# Patient Record
Sex: MALE | Race: WHITE | NOT HISPANIC OR LATINO | Employment: OTHER | URBAN - METROPOLITAN AREA
[De-identification: names, ages, dates, MRNs, and addresses within clinical notes are randomized per-mention and may not be internally consistent; named-entity substitution may affect disease eponyms.]

---

## 2023-04-28 LAB — CARCINOEMBRYONIC AG (NG/ML) IN SER/PLAS: 2 UG/L

## 2023-06-05 LAB — PROSTATE SPECIFIC AG (NG/ML) IN SER/PLAS: 7.05 NG/ML (ref 0–4)

## 2023-07-28 LAB
ALBUMIN (G/DL) IN SER/PLAS: 4.4 G/DL (ref 3.4–5)
ANION GAP IN SER/PLAS: 14 MMOL/L (ref 10–20)
CALCIUM (MG/DL) IN SER/PLAS: 10 MG/DL (ref 8.6–10.6)
CARBON DIOXIDE, TOTAL (MMOL/L) IN SER/PLAS: 27 MMOL/L (ref 21–32)
CARCINOEMBRYONIC AG (NG/ML) IN SER/PLAS: 1.8 UG/L
CHLORIDE (MMOL/L) IN SER/PLAS: 101 MMOL/L (ref 98–107)
CREATININE (MG/DL) IN SER/PLAS: 0.88 MG/DL (ref 0.5–1.3)
GFR MALE: 90 ML/MIN/1.73M2
GLUCOSE (MG/DL) IN SER/PLAS: 83 MG/DL (ref 74–99)
PHOSPHATE (MG/DL) IN SER/PLAS: 3.2 MG/DL (ref 2.5–4.9)
POTASSIUM (MMOL/L) IN SER/PLAS: 4.7 MMOL/L (ref 3.5–5.3)
SODIUM (MMOL/L) IN SER/PLAS: 137 MMOL/L (ref 136–145)
UREA NITROGEN (MG/DL) IN SER/PLAS: 17 MG/DL (ref 6–23)

## 2023-11-13 NOTE — PROGRESS NOTES
DANY Naranjo is a 73 y.o. male who was diagnosed with T3N0M0 laryngeal scc after he presented to ENT 12/2021 with hoarseness and neck pain x 6 months. He underwent direct laryngoscopy with biopsy, bronchoscopy, esophagoscopy, tracheostomy with Dr. Guerin and PEG placed by Dr. Walters on 1/10/2022. He completed chemoradiation 4/8/22. He was decannulated 5/5/22 by Dr. Guerin.      He was admitted 7/28-8/5 for covid pneumonia, dysphagia, and nausea. GI was consulted and performed EGD on 8/2; however, procedure was aborted due to inability to traverse the upper esophageal sphincter due to complete stenosis/possible radiation-induced stricture. Repeat EGD with fluoroscopy was performed 8/3 with esophageal dilation and stent placed. PET CT on 7/29 showed hypermetabolic masses within the sigmoid colon and hepatic flexure concerning for multifocal colonic malignancy. Colonoscopy performed 8/2 demonstrating a polypoid non-obstructing large mass was in the rectosigmoid at 15cm. Biopsies taken and path consistent with moderately differentiated invasive adenocarcinoma.     MRI prostate demonstrated a PIRAD 5 lesion. He underwent biopsy of the lesion which was negative. Plans to monitor with PSA levels. He is s/p open LAR on 1/4/23. Anastomosis about 8cm from anal verge. Path T2N1a. One tumor deposit in the perirectal soft tissue. He was started on CapeOx on 3/6 with plans for 4 cycles. He was admitted in March with dehydration after diarrhea, nausea and vomiting x3 days after his chemo and chemo was stopped.      Due for colonoscopy in Jan. Bowel function has improved. He is doing well overall.      CEA 8/30/22: 1.7; 2/2023 1.6; 4/2023 2.0; 7/2023 1.8     PET/CT Head & Neck staging 7/29/22: Hypermetabolic masses within the sigmoid colon and hepatic flexure. This is concerning for multifocal colonic malignancy, recommend correlation with colonoscopy and tissue sampling. Resolution of the patient's hypermetabolic  laryngeal mass and cervical lymphadenopathy, no residual hypermetabolic activity in this area to suggest locally recurrent/residual malignancy. Multifocal hypermetabolic consolidations of the bilateral lower lobes, in a pattern concerning for infectious inflammatory process such as aspiration pneumonia. Recommend correlation with clinical concern of infection and clinical findings of aspiration. Hypermetabolic focus within the left parotid gland, this is not completely characterized and differential possibilities include Warthin's tumor. Correlation with nonemergent parotid ultrasound may be of value.     CT angio chest 9/28/23: Since 3 days earlier, interval development of moderate bilateral lung infiltrates suggesting pneumonia, most pronounced in the right lower lobe. New small right pleural effusion. No detectable pulmonary emboli. Remainder as above.    CT a/p 9/25/2023: Bowel wall thickening in the visualized portions of the transverse and descending colon with adjacent pericolonic stranding suggestive of colitis. Other portions of the colon are not well seen due to lack of distention. Periportal edema. Prostatomegaly. Chronic pancreatitis. Mild centrilobular pulmonary emphysematous changes.     MRI Rectum 8/30/22: Polypoid intraluminal mass measuring 3.6 cm in maximal dimension, located 10.1 cm above the level of the anal verge, as discussed above, consistent with the known adenocarcinoma. No extraluminal tumor signal is demonstrated; most of the mass is located in an area above the level of the peritoneal reflection. No mesorectal or other pelvic lymphadenopathy is demonstrated. Nodular focus of diffusion restriction in the right aspect of the peripheral zone of the prostate, at the level of the mid gland (as discussed above). A prostate carcinoma is not excluded on the basis of this finding; dedicated contrast-enhanced MRI of the prostate is recommended for further evaluation. MRI based staging is: T1/2  N0.     Colonoscopy 8/2/22 ([facility]): Preparation of the colon was fair. One TA in the cecum, removed with a cold snare. Resected and retrieved. Injected. Three TA's in the ascending colon, removed with a cold snare. Resected and retrieved. One TA in the transverse colon, removed with a cold snare. Resected and retrieved. A polypoid non-obstructing large mass was found in the rectosigmoid -- on flexible exam this was at 15. The mass was non-circumferential. The mass measured four cm in length. It may have abroad stalk. No bleeding was present. This was biopsied with a cold forceps for histology. Path consistent with moderately differentiated invasive adenocarcinoma.     No past medical history on file.    Past Surgical History:   Procedure Laterality Date    OTHER SURGICAL HISTORY  08/18/2022    Shoulder surgery       Not on File    Review of Systems    Physical Exam  Constitutional:       Appearance: Normal appearance.   Abdominal:      General: Abdomen is flat.      Palpations: Abdomen is soft.      Comments: Midline scar well healed, no hernia   Neurological:      Mental Status: He is alert.         Assessment and Plan:   Doing well.  Colonoscopy in January - referred to Dr Bukc  Seeing his ENT next week to discuss swallowing difficulities. If he needs EGD, this can likely be coordinated.   CEA today  Follow up 4 months

## 2023-11-21 ENCOUNTER — LAB (OUTPATIENT)
Dept: LAB | Facility: LAB | Age: 73
End: 2023-11-21
Payer: MEDICARE

## 2023-11-21 ENCOUNTER — OFFICE VISIT (OUTPATIENT)
Dept: SURGERY | Facility: CLINIC | Age: 73
End: 2023-11-21
Payer: MEDICARE

## 2023-11-21 VITALS
BODY MASS INDEX: 18.84 KG/M2 | SYSTOLIC BLOOD PRESSURE: 116 MMHG | DIASTOLIC BLOOD PRESSURE: 72 MMHG | WEIGHT: 103 LBS | HEART RATE: 93 BPM

## 2023-11-21 DIAGNOSIS — C20 RECTAL CANCER (MULTI): ICD-10-CM

## 2023-11-21 LAB — CEA SERPL-MCNC: 2.1 UG/L

## 2023-11-21 PROCEDURE — 1126F AMNT PAIN NOTED NONE PRSNT: CPT | Performed by: SURGERY

## 2023-11-21 PROCEDURE — 99213 OFFICE O/P EST LOW 20 MIN: CPT | Performed by: SURGERY

## 2023-11-21 PROCEDURE — 3008F BODY MASS INDEX DOCD: CPT | Performed by: SURGERY

## 2023-11-21 PROCEDURE — 1159F MED LIST DOCD IN RCRD: CPT | Performed by: SURGERY

## 2023-11-21 PROCEDURE — 82378 CARCINOEMBRYONIC ANTIGEN: CPT

## 2023-11-21 PROCEDURE — 36415 COLL VENOUS BLD VENIPUNCTURE: CPT

## 2023-11-24 ENCOUNTER — APPOINTMENT (OUTPATIENT)
Dept: SURGERY | Facility: CLINIC | Age: 73
End: 2023-11-24

## 2023-11-30 ENCOUNTER — OFFICE VISIT (OUTPATIENT)
Dept: OTOLARYNGOLOGY | Facility: HOSPITAL | Age: 73
End: 2023-11-30
Payer: MEDICARE

## 2023-11-30 VITALS — HEIGHT: 62 IN | BODY MASS INDEX: 19.16 KG/M2 | WEIGHT: 104.1 LBS | TEMPERATURE: 97.2 F

## 2023-11-30 DIAGNOSIS — K22.2 ESOPHAGEAL STENOSIS: Primary | ICD-10-CM

## 2023-11-30 DIAGNOSIS — C76.0 MALIGNANT NEOPLASM OF HEAD, FACE AND NECK (MULTI): Primary | ICD-10-CM

## 2023-11-30 PROCEDURE — 99214 OFFICE O/P EST MOD 30 MIN: CPT | Performed by: STUDENT IN AN ORGANIZED HEALTH CARE EDUCATION/TRAINING PROGRAM

## 2023-11-30 PROCEDURE — 1126F AMNT PAIN NOTED NONE PRSNT: CPT | Performed by: STUDENT IN AN ORGANIZED HEALTH CARE EDUCATION/TRAINING PROGRAM

## 2023-11-30 PROCEDURE — 99214 OFFICE O/P EST MOD 30 MIN: CPT | Mod: 24 | Performed by: STUDENT IN AN ORGANIZED HEALTH CARE EDUCATION/TRAINING PROGRAM

## 2023-11-30 PROCEDURE — 1159F MED LIST DOCD IN RCRD: CPT | Performed by: STUDENT IN AN ORGANIZED HEALTH CARE EDUCATION/TRAINING PROGRAM

## 2023-11-30 PROCEDURE — 3008F BODY MASS INDEX DOCD: CPT | Performed by: STUDENT IN AN ORGANIZED HEALTH CARE EDUCATION/TRAINING PROGRAM

## 2023-11-30 PROCEDURE — 1160F RVW MEDS BY RX/DR IN RCRD: CPT | Performed by: STUDENT IN AN ORGANIZED HEALTH CARE EDUCATION/TRAINING PROGRAM

## 2023-11-30 ASSESSMENT — PATIENT HEALTH QUESTIONNAIRE - PHQ9
1. LITTLE INTEREST OR PLEASURE IN DOING THINGS: NOT AT ALL
SUM OF ALL RESPONSES TO PHQ9 QUESTIONS 1 & 2: 0
2. FEELING DOWN, DEPRESSED OR HOPELESS: NOT AT ALL

## 2024-01-04 ENCOUNTER — ANESTHESIA (OUTPATIENT)
Dept: GASTROENTEROLOGY | Facility: HOSPITAL | Age: 74
End: 2024-01-04
Payer: MEDICARE

## 2024-01-04 ENCOUNTER — ANESTHESIA EVENT (OUTPATIENT)
Dept: GASTROENTEROLOGY | Facility: HOSPITAL | Age: 74
End: 2024-01-04
Payer: MEDICARE

## 2024-01-04 ENCOUNTER — HOSPITAL ENCOUNTER (OUTPATIENT)
Dept: GASTROENTEROLOGY | Facility: HOSPITAL | Age: 74
Setting detail: OUTPATIENT SURGERY
Discharge: HOME | End: 2024-01-04
Payer: MEDICARE

## 2024-01-04 VITALS
OXYGEN SATURATION: 96 % | WEIGHT: 105 LBS | SYSTOLIC BLOOD PRESSURE: 121 MMHG | DIASTOLIC BLOOD PRESSURE: 76 MMHG | RESPIRATION RATE: 11 BRPM | HEIGHT: 62 IN | HEART RATE: 67 BPM | BODY MASS INDEX: 19.32 KG/M2 | TEMPERATURE: 97.7 F

## 2024-01-04 DIAGNOSIS — C20 RECTAL CANCER (MULTI): ICD-10-CM

## 2024-01-04 DIAGNOSIS — K22.2 ESOPHAGEAL STENOSIS: ICD-10-CM

## 2024-01-04 PROCEDURE — G0105 COLORECTAL SCRN; HI RISK IND: HCPCS | Performed by: STUDENT IN AN ORGANIZED HEALTH CARE EDUCATION/TRAINING PROGRAM

## 2024-01-04 PROCEDURE — 2500000005 HC RX 250 GENERAL PHARMACY W/O HCPCS

## 2024-01-04 PROCEDURE — 45378 DIAGNOSTIC COLONOSCOPY: CPT | Performed by: INTERNAL MEDICINE

## 2024-01-04 PROCEDURE — 99100 ANES PT EXTEME AGE<1 YR&>70: CPT | Performed by: ANESTHESIOLOGY

## 2024-01-04 PROCEDURE — 43235 EGD DIAGNOSTIC BRUSH WASH: CPT | Performed by: STUDENT IN AN ORGANIZED HEALTH CARE EDUCATION/TRAINING PROGRAM

## 2024-01-04 PROCEDURE — 2500000004 HC RX 250 GENERAL PHARMACY W/ HCPCS (ALT 636 FOR OP/ED)

## 2024-01-04 PROCEDURE — A45378 PR COLONOSCOPY,DIAGNOSTIC

## 2024-01-04 PROCEDURE — 7100000009 HC PHASE TWO TIME - INITIAL BASE CHARGE: Performed by: INTERNAL MEDICINE

## 2024-01-04 PROCEDURE — 3700000002 HC GENERAL ANESTHESIA TIME - EACH INCREMENTAL 1 MINUTE: Performed by: INTERNAL MEDICINE

## 2024-01-04 PROCEDURE — 7100000010 HC PHASE TWO TIME - EACH INCREMENTAL 1 MINUTE: Performed by: INTERNAL MEDICINE

## 2024-01-04 PROCEDURE — 3700000001 HC GENERAL ANESTHESIA TIME - INITIAL BASE CHARGE: Performed by: INTERNAL MEDICINE

## 2024-01-04 PROCEDURE — 43235 EGD DIAGNOSTIC BRUSH WASH: CPT | Performed by: INTERNAL MEDICINE

## 2024-01-04 PROCEDURE — A45378 PR COLONOSCOPY,DIAGNOSTIC: Performed by: ANESTHESIOLOGY

## 2024-01-04 RX ORDER — LIDOCAINE HCL/PF 100 MG/5ML
SYRINGE (ML) INTRAVENOUS AS NEEDED
Status: DISCONTINUED | OUTPATIENT
Start: 2024-01-04 | End: 2024-01-04

## 2024-01-04 RX ORDER — LIDOCAINE HYDROCHLORIDE 10 MG/ML
0.1 INJECTION INFILTRATION; PERINEURAL ONCE
OUTPATIENT
Start: 2024-01-04 | End: 2024-01-04

## 2024-01-04 RX ORDER — PROPOFOL 10 MG/ML
INJECTION, EMULSION INTRAVENOUS AS NEEDED
Status: DISCONTINUED | OUTPATIENT
Start: 2024-01-04 | End: 2024-01-04

## 2024-01-04 RX ORDER — FENTANYL CITRATE 50 UG/ML
INJECTION, SOLUTION INTRAMUSCULAR; INTRAVENOUS AS NEEDED
Status: DISCONTINUED | OUTPATIENT
Start: 2024-01-04 | End: 2024-01-04

## 2024-01-04 RX ORDER — SODIUM CHLORIDE, SODIUM LACTATE, POTASSIUM CHLORIDE, CALCIUM CHLORIDE 600; 310; 30; 20 MG/100ML; MG/100ML; MG/100ML; MG/100ML
50 INJECTION, SOLUTION INTRAVENOUS CONTINUOUS
OUTPATIENT
Start: 2024-01-04

## 2024-01-04 RX ORDER — PROPOFOL 10 MG/ML
INJECTION, EMULSION INTRAVENOUS CONTINUOUS PRN
Status: DISCONTINUED | OUTPATIENT
Start: 2024-01-04 | End: 2024-01-04

## 2024-01-04 RX ADMIN — PROPOFOL 200 MCG/KG/MIN: 10 INJECTION, EMULSION INTRAVENOUS at 12:28

## 2024-01-04 RX ADMIN — PROPOFOL 20 MG: 10 INJECTION, EMULSION INTRAVENOUS at 12:30

## 2024-01-04 RX ADMIN — SODIUM CHLORIDE, SODIUM LACTATE, POTASSIUM CHLORIDE, AND CALCIUM CHLORIDE: 600; 310; 30; 20 INJECTION, SOLUTION INTRAVENOUS at 12:24

## 2024-01-04 RX ADMIN — FENTANYL CITRATE 25 MCG: 50 INJECTION, SOLUTION INTRAMUSCULAR; INTRAVENOUS at 12:28

## 2024-01-04 RX ADMIN — LIDOCAINE HYDROCHLORIDE 50 MG: 20 INJECTION INTRAVENOUS at 12:28

## 2024-01-04 ASSESSMENT — PAIN SCALES - GENERAL
PAINLEVEL_OUTOF10: 0 - NO PAIN

## 2024-01-04 ASSESSMENT — PAIN - FUNCTIONAL ASSESSMENT
PAIN_FUNCTIONAL_ASSESSMENT: 0-10

## 2024-01-04 ASSESSMENT — COLUMBIA-SUICIDE SEVERITY RATING SCALE - C-SSRS
6. HAVE YOU EVER DONE ANYTHING, STARTED TO DO ANYTHING, OR PREPARED TO DO ANYTHING TO END YOUR LIFE?: NO
1. IN THE PAST MONTH, HAVE YOU WISHED YOU WERE DEAD OR WISHED YOU COULD GO TO SLEEP AND NOT WAKE UP?: NO
2. HAVE YOU ACTUALLY HAD ANY THOUGHTS OF KILLING YOURSELF?: NO

## 2024-01-04 NOTE — ANESTHESIA PREPROCEDURE EVALUATION
Patient: Jeff Naranjo    Procedure Information       Date/Time: 01/04/24 1200    Scheduled providers: Rashi Buck MD; Gemini Marcos MD; Kylah Gil RN    Procedures:       COLONOSCOPY      EGD    Location: [facility]            Relevant Problems   Anesthesia (within normal limits)      Cardiovascular (within normal limits)      Endocrine (within normal limits)      GI   (+) Esophageal stricture      /Renal (within normal limits)      Neuro/Psych (within normal limits)      Pulmonary (within normal limits)      GI/Hepatic (within normal limits)      Hematology (within normal limits)      Musculoskeletal (within normal limits)      Eyes, Ears, Nose, and Throat  History of laryngeal cancer s/p chemo radiation      Infectious Disease (within normal limits)       Clinical information reviewed:   Tobacco  Allergies  Meds   Med Hx  Surg Hx   Fam Hx  Soc Hx        NPO Detail:  NPO/Void Status  Carbonhydrate Drink Given Prior to Surgery? : Y  Date of Last Liquid: 01/04/24  Time of Last Liquid: 0800  Date of Last Solid: 01/02/24  Time of Last Solid: 0700  Last Intake Type: GI prep  Time of Last Void: 1124         Physical Exam    Airway  Mallampati: II  TM distance: >3 FB  Neck ROM: full     Cardiovascular    Dental   (+) upper dentures, lower dentures     Pulmonary    Abdominal            Anesthesia Plan    ASA 3     MAC     intravenous induction   Anesthetic plan and risks discussed with patient.

## 2024-01-04 NOTE — H&P
"History Of Present Illness  Jeff Naranjo is a 73 y.o. male presenting with hx of esophageal stricture due to throat CA s\p dilations and rectal CA s\p chemorads.     Past Medical History  History reviewed. No pertinent past medical history.  Surgical History  Past Surgical History:   Procedure Laterality Date    OTHER SURGICAL HISTORY  08/18/2022    Shoulder surgery     Social History  He reports that he has been smoking cigarettes. He does not have any smokeless tobacco history on file. He reports that he does not drink alcohol and does not use drugs.    Family History  No family history on file.     Allergies  No Known Allergies  Review of Systems   All other systems reviewed and are negative.       Physical Exam  Vitals reviewed.   Constitutional:       General: He is awake.   Cardiovascular:      Rate and Rhythm: Normal rate and regular rhythm.   Pulmonary:      Effort: Pulmonary effort is normal.      Breath sounds: Normal breath sounds.   Neurological:      Mental Status: He is alert and oriented to person, place, and time.   Psychiatric:         Attention and Perception: Attention and perception normal.         Behavior: Behavior normal.          Last Recorded Vitals  Blood pressure 130/78, pulse 78, temperature 36.4 °C (97.5 °F), temperature source Temporal, resp. rate 18, height 1.577 m (5' 2.09\"), weight 47.6 kg (105 lb), SpO2 100 %.    Assessment/Plan   Plan for EGD and colonoscopy     Binh Espinoza MD  "

## 2024-01-04 NOTE — ANESTHESIA POSTPROCEDURE EVALUATION
Patient: Jeff Naranjo    Procedure Summary       Date: 01/04/24 Room / Location: [facility]    Anesthesia Start: 1225 Anesthesia Stop: 1317    Procedures:       COLONOSCOPY      EGD Diagnosis:       Rectal cancer (CMS/HCC)      Esophageal stenosis    Scheduled Providers: Rashi Buck MD; Gemini Marcos MD; Kylah Gil RN Responsible Provider: Jb Durant MD    Anesthesia Type: MAC ASA Status: 3            Anesthesia Type: MAC    Vitals Value Taken Time   /76 01/04/24 1330   Temp 36.5 °C (97.7 °F) 01/04/24 1315   Pulse 64 01/04/24 1330   Resp 15 01/04/24 1330   SpO2 95 % 01/04/24 1330       Anesthesia Post Evaluation    Patient location during evaluation: PACU  Patient participation: complete - patient participated  Level of consciousness: awake and alert  Pain management: adequate  Airway patency: patent  Cardiovascular status: acceptable  Respiratory status: acceptable  Hydration status: acceptable  Postoperative Nausea and Vomiting: none        There were no known notable events for this encounter.

## 2024-03-20 NOTE — PROGRESS NOTES
HPI    Jeff Naranjo is a 73 y.o. male who was diagnosed with T3N0M0 laryngeal scc after he presented to ENT 12/2021 with hoarseness and neck pain x 6 months. He underwent direct laryngoscopy with biopsy, bronchoscopy, esophagoscopy, tracheostomy with Dr. Guerin and PEG placed by Dr. Walters on 1/10/2022. He completed chemoradiation 4/8/22. He was decannulated 5/5/22 by Dr. Guerin.      He was admitted 7/28-8/5 for covid pneumonia, dysphagia, and nausea. GI was consulted and performed EGD on 8/2; however, procedure was aborted due to inability to traverse the upper esophageal sphincter due to complete stenosis/possible radiation-induced stricture. Repeat EGD with fluoroscopy was performed 8/3 with esophageal dilation and stent placed. PET CT on 7/29 showed hypermetabolic masses within the sigmoid colon and hepatic flexure concerning for multifocal colonic malignancy. Colonoscopy performed 8/2 demonstrating a polypoid non-obstructing large mass was in the rectosigmoid at 15cm. Biopsies taken and path consistent with moderately differentiated invasive adenocarcinoma.     MRI prostate demonstrated a PIRAD 5 lesion. He underwent biopsy of the lesion which was negative. Plans to monitor with PSA levels. He is s/p open LAR on 1/4/23. Anastomosis about 8cm from anal verge. Path T2N1a. One tumor deposit in the perirectal soft tissue. He was started on CapeOx on 3/6 with plans for 4 cycles. He was admitted in March with dehydration after diarrhea, nausea and vomiting x3 days after his chemo and chemo was stopped.      He is moving his bowels 2 times per day. Some straining.      CEA 8/30/22: 1.7; 2/2023 1.6; 4/2023 2.0; 7/2023 1.8; 11/2023: 2.1     PET/CT Head & Neck staging 7/29/22: Hypermetabolic masses within the sigmoid colon and hepatic flexure. This is concerning for multifocal colonic malignancy, recommend correlation with colonoscopy and tissue sampling. Resolution of the patient's hypermetabolic laryngeal mass and  cervical lymphadenopathy, no residual hypermetabolic activity in this area to suggest locally recurrent/residual malignancy. Multifocal hypermetabolic consolidations of the bilateral lower lobes, in a pattern concerning for infectious inflammatory process such as aspiration pneumonia. Recommend correlation with clinical concern of infection and clinical findings of aspiration. Hypermetabolic focus within the left parotid gland, this is not completely characterized and differential possibilities include Warthin's tumor. Correlation with nonemergent parotid ultrasound may be of value.     CT angio chest 9/28/23: Since 3 days earlier, interval development of moderate bilateral lung infiltrates suggesting pneumonia, most pronounced in the right lower lobe. New small right pleural effusion. No detectable pulmonary emboli. Remainder as above.    CT a/p 9/25/2023: Bowel wall thickening in the visualized portions of the transverse and descending colon with adjacent pericolonic stranding suggestive of colitis. Other portions of the colon are not well seen due to lack of distention. Periportal edema. Prostatomegaly. Chronic pancreatitis. Mild centrilobular pulmonary emphysematous changes.     MRI Rectum 8/30/22: Polypoid intraluminal mass measuring 3.6 cm in maximal dimension, located 10.1 cm above the level of the anal verge, as discussed above, consistent with the known adenocarcinoma. No extraluminal tumor signal is demonstrated; most of the mass is located in an area above the level of the peritoneal reflection. No mesorectal or other pelvic lymphadenopathy is demonstrated. Nodular focus of diffusion restriction in the right aspect of the peripheral zone of the prostate, at the level of the mid gland (as discussed above). A prostate carcinoma is not excluded on the basis of this finding; dedicated contrast-enhanced MRI of the prostate is recommended for further evaluation. MRI based staging is: T1/2 N0.     Colonoscopy  1/4/24 ([facility]): Healthy end-to-end colorectal anastomosis in the rectosigmoid 15 cm from the anal verge. Normal. Repeat colonoscopy in 3 years       No past medical history on file.    Past Surgical History:   Procedure Laterality Date    OTHER SURGICAL HISTORY  08/18/2022    Shoulder surgery       No Known Allergies    Review of Systems   Constitutional:  Negative for activity change, appetite change, chills, diaphoresis, fatigue, fever and unexpected weight change.   All other systems reviewed and are negative.      Physical Exam  Constitutional:       Appearance: Normal appearance.   Abdominal:      General: Abdomen is flat.      Palpations: Abdomen is soft.      Comments: Midline scar well healed, no hernia   Neurological:      Mental Status: He is alert.         Assessment and Plan:   Doing well. Bowel function is good. Will add more fiber to diet as he gets constipated sometimes.   Labs today  Follow up 4 months - flex sig in office at that time, will need CT scan in fall

## 2024-03-21 ENCOUNTER — APPOINTMENT (OUTPATIENT)
Dept: OTOLARYNGOLOGY | Facility: HOSPITAL | Age: 74
End: 2024-03-21
Payer: MEDICARE

## 2024-03-22 ENCOUNTER — LAB (OUTPATIENT)
Dept: LAB | Facility: LAB | Age: 74
End: 2024-03-22
Payer: MEDICARE

## 2024-03-22 ENCOUNTER — OFFICE VISIT (OUTPATIENT)
Dept: SURGERY | Facility: CLINIC | Age: 74
End: 2024-03-22
Payer: MEDICARE

## 2024-03-22 VITALS
HEART RATE: 74 BPM | BODY MASS INDEX: 20.06 KG/M2 | WEIGHT: 110 LBS | SYSTOLIC BLOOD PRESSURE: 111 MMHG | DIASTOLIC BLOOD PRESSURE: 75 MMHG

## 2024-03-22 DIAGNOSIS — C20 RECTAL CANCER (MULTI): ICD-10-CM

## 2024-03-22 LAB — CEA SERPL-MCNC: 2.2 UG/L

## 2024-03-22 PROCEDURE — 82378 CARCINOEMBRYONIC ANTIGEN: CPT

## 2024-03-22 PROCEDURE — 36415 COLL VENOUS BLD VENIPUNCTURE: CPT

## 2024-03-22 PROCEDURE — 99213 OFFICE O/P EST LOW 20 MIN: CPT | Performed by: SURGERY

## 2024-03-22 PROCEDURE — 1159F MED LIST DOCD IN RCRD: CPT | Performed by: SURGERY

## 2024-03-22 ASSESSMENT — ENCOUNTER SYMPTOMS
CHILLS: 0
FEVER: 0
UNEXPECTED WEIGHT CHANGE: 0
FATIGUE: 0
APPETITE CHANGE: 0
ACTIVITY CHANGE: 0
DIAPHORESIS: 0

## 2024-04-04 ENCOUNTER — OFFICE VISIT (OUTPATIENT)
Dept: OTOLARYNGOLOGY | Facility: HOSPITAL | Age: 74
End: 2024-04-04
Payer: MEDICARE

## 2024-04-04 ENCOUNTER — LAB (OUTPATIENT)
Dept: LAB | Facility: HOSPITAL | Age: 74
End: 2024-04-04
Payer: MEDICARE

## 2024-04-04 VITALS — WEIGHT: 113.9 LBS | BODY MASS INDEX: 20.77 KG/M2

## 2024-04-04 DIAGNOSIS — C76.0 MALIGNANT NEOPLASM OF HEAD, FACE AND NECK (MULTI): ICD-10-CM

## 2024-04-04 DIAGNOSIS — T66.XXXD ADVERSE EFFECT OF RADIATION THERAPY, SUBSEQUENT ENCOUNTER: ICD-10-CM

## 2024-04-04 DIAGNOSIS — Z03.89 OBSERVATION FOR SUSPECTED CANCER: ICD-10-CM

## 2024-04-04 DIAGNOSIS — E03.9 ACQUIRED HYPOTHYROIDISM: ICD-10-CM

## 2024-04-04 LAB — TSH SERPL-ACNC: 3.68 MIU/L (ref 0.44–3.98)

## 2024-04-04 PROCEDURE — 99214 OFFICE O/P EST MOD 30 MIN: CPT | Performed by: STUDENT IN AN ORGANIZED HEALTH CARE EDUCATION/TRAINING PROGRAM

## 2024-04-04 PROCEDURE — 1159F MED LIST DOCD IN RCRD: CPT | Performed by: STUDENT IN AN ORGANIZED HEALTH CARE EDUCATION/TRAINING PROGRAM

## 2024-04-04 PROCEDURE — 36415 COLL VENOUS BLD VENIPUNCTURE: CPT

## 2024-04-04 PROCEDURE — 1160F RVW MEDS BY RX/DR IN RCRD: CPT | Performed by: STUDENT IN AN ORGANIZED HEALTH CARE EDUCATION/TRAINING PROGRAM

## 2024-04-04 PROCEDURE — 84443 ASSAY THYROID STIM HORMONE: CPT

## 2024-04-04 NOTE — PROGRESS NOTES
Chief Complaint:    Chief Complaint   Patient presents with    Follow-up     History of Present Illness:  74 y.o. male presents to [facility] with a T3N0M0 laryngeal squamous cell carcinoma. He underwent triple endoscopy, tracheostomy, and PEG tube placement on 1/10/2022. The patient completed his chemoradiation on 4/20/2022.     The patients course has been complicated by complete stenosis of the upper esophagus s/p dilation and stent placement on 8/3/2022 as well as a new finding of a sigmoid adenocarcinoma. In addition, during work-up for her his colon issues he was also found to have a prostate mass for which he is seeing urology. Work-up of the prostate was negative for malignancy. He is now status post surgery (with Dr. Fallon) for his rectal cancer.     11/30/23  The patient presents today for follow-up.  He has been doing okay from a laryngeal cancer standpoint.  He denies any breathing difficulties but has found that his swallowing has become more difficult.  He is still elevated to tolerate a regular diet, but he feels a tightness in his neck.  He has worked with speech therapy in the past, but not recently.  He is doing well from a colorectal cancer standpoint.  He is actually due to undergo a colonoscopy in January and there were thoughts that he could undergo an EGD at that time.  This will be with Dr. Buck.      Past Surgical History  Past Surgical History:   Procedure Laterality Date    OTHER SURGICAL HISTORY  08/18/2022    Shoulder surgery       Social History  Social History     Socioeconomic History    Marital status:      Spouse name: Not on file    Number of children: Not on file    Years of education: Not on file    Highest education level: Not on file   Occupational History    Not on file   Tobacco Use    Smoking status: Some Days     Types: Cigarettes    Smokeless tobacco: Not on file   Substance and Sexual Activity    Alcohol use: Not on file     "Drug use: Not on file    Sexual activity: Not on file   Other Topics Concern    Not on file   Social History Narrative    Not on file     Social Determinants of Health     Financial Resource Strain: Not on file   Food Insecurity: Not on file   Transportation Needs: Not on file   Physical Activity: Not on file   Stress: Not on file   Social Connections: Not on file   Intimate Partner Violence: Not on file   Housing Stability: Not on file       Family History  No family history on file.    Medications:  No current outpatient medications on file.     No current facility-administered medications for this visit.       Allergies: Patient has no known allergies.    Immunizations:   There is no immunization history on file for this patient.     Physical Exam  Vital Signs:  Temp 36.2 °C (97.2 °F)   Ht 1.575 m (5' 2\")   Wt 47.2 kg (104 lb 1.6 oz)   BMI 19.04 kg/m²   Constitutional   General appearance: Healthy-appearing, well-nourished, well groomed, in no acute distress. Thin male-appears thinner than last visit. Long beard. Appears to be M[employer]onite. Wife in room.  Ability to communicate: Normal communication without aids, normal voice quality. Hoarseness is significantly improved since last visit. No stridor.  Head and face: Atraumatic with no masses, lesions, or scarring.   Facial strength: Normal strength and symmetry, no synkinesis or facial tic.   Eyes   Pupils and irises: EOM intact, PERRLA, conjunctiva non-injected.   Ears   External inspection of ears: Ears normally formed and free of lesions.   Nose: Dorsum symmetric with no visible or palpable deformities.   External inspection of nose: No nasal lesions, lacerations, or scars. Nasal tip symmetrical with normal nasal valves.   Oral Cavity/Mouth   Lips, teeth, and gums: Normal lips, gums, and edentulous. Upper and lower dentures.  Oropharynx: Mucosa moist, no lesions. Hard and soft palate normal. Tongue normal, no lesions or edema. Tonsils normal, no lesions. "   Neck: Symmetrical, trachea midline. No masses visible. Radiation related firmness of the neck.  Palpation of cervical lymph nodes: No palpable lymph node enlargement, no submandibular adenopathy, no anterior cervical adenopathy, no supraclavicular adenopathy.   Neurological/Psychiatric   Cranial Nerve Examination: II - XII grossly intact.   Orientation to person, place, and time: Normal.   Mood and affect: Normal.   Skin: Normal without rashes or lesions.   Pulmonary   Respiratory effort: Chest expands symmetrically.   Cardiovascular: Good peripheral pulses   Peripheral vascular system: No varicosities, carotid pulse normal, no edema. No jugular venous distension.   Extremities Appearance of extremities: Normal. Gait normal.      Procedure  Procedure note: Recommended flexible nasopharyngoscopy. Risks, benefits, and alternatives were explained.   Procedure: Flexible nasopharyngoscopy   Indications: Head and neck cancer history  Anesthesia: 4% lidocaine and 0.5% phenylephrine   After adequate Afrin and lidocaine spray advance the flexible endoscope. I was able to visualize the nasal cavity and nasopharynx. The findings were notable for the following:  No masses/lesions/ulcers. The right vocal cord is completely immobile- this is stable from prior to treatment. There is no evidence of mucosal lesion or mass. He does have evidence of some anterior subglottic stenosis but he has an adequately patent airway posteriorly. No concerning lesions or masses. No pooling of secretions.     Impression/Plan:  73 y.o. male presenting to [facility] with a T3N0M0 laryngeal squamous cell carcinoma. He underwent triple endoscopy, tracheostomy, and PEG tube placement on 1/10/2022. The patient completed his chemoradiation on 4/20/2022. No evidence of disease today. He seems to also be doing well from a colorectal cancer standpoint.     -The patient is scheduled to undergo a colonoscopy in January.  I  will reach out to Dr. Buck to see whether an EGD with possible dilation can also be done at that time  -Continue follow-up with his colorectal team  -The patient is up-to-date in terms of TSH and chest imaging  -We will plan for follow-up in 4 months.

## 2024-04-04 NOTE — PROGRESS NOTES
Chief Complaint:    No chief complaint on file.      History of Present Illness:  74 y.o. male presents to [facility] with a T3N0M0 laryngeal squamous cell carcinoma. He underwent triple endoscopy, tracheostomy, and PEG tube placement on 1/10/2022. The patient completed his chemoradiation on 4/20/2022.     The patients course has been complicated by complete stenosis of the upper esophagus s/p dilation and stent placement on 8/3/2022 as well as a new finding of a sigmoid adenocarcinoma. In addition, during work-up for her his colon issues he was also found to have a prostate mass for which he is seeing urology. Work-up of the prostate was negative for malignancy. He is now status post surgery (with Dr. Fallon) for his rectal cancer.     04/04/24  The patient presents today for follow-up.  He has been doing fine from a laryngeal cancer standpoint.  He still continues to have some dysphagia and some tightness around his neck, which I think is radiation related.  He is following up with his gastroenterologist and colorectal surgeon.  He seems to be doing well from a rectal cancer standpoint.  He continues to tolerate a diet and his weight is stable.  He denies any voice or breathing difficulties.    Past Medical History  Rectal cancer, laryngeal cancer    Past Surgical History  Past Surgical History:   Procedure Laterality Date    OTHER SURGICAL HISTORY  08/18/2022    Shoulder surgery       Social History  Social History     Socioeconomic History    Marital status:      Spouse name: Not on file    Number of children: Not on file    Years of education: Not on file    Highest education level: Not on file   Occupational History    Not on file   Tobacco Use    Smoking status: Some Days     Types: Cigarettes    Smokeless tobacco: Not on file   Substance and Sexual Activity    Alcohol use: Never    Drug use: Never    Sexual activity: Defer   Other Topics Concern    Not on file    Social History Narrative    Not on file     Social Determinants of Health     Financial Resource Strain: Not on file   Food Insecurity: Not on file   Transportation Needs: Not on file   Physical Activity: Not on file   Stress: Not on file   Social Connections: Not on file   Intimate Partner Violence: Not on file   Housing Stability: Not on file       Family History  No family history on file.    Medications:  No current outpatient medications on file.     No current facility-administered medications for this visit.       Allergies: Patient has no known allergies.    Immunizations:   There is no immunization history on file for this patient.     Physical Exam  Vital Signs:  Wt 51.7 kg (113 lb 14.4 oz)   BMI 20.77 kg/m²   Constitutional   General appearance: Healthy-appearing, well-nourished, well groomed, in no acute distress. Thin male. Long beard. Appears to be M[employer]onite. Wife in room.  Ability to communicate: Normal communication without aids, normal voice quality. Hoarseness is significantly improved since last visit. No stridor.  Head and face: Atraumatic with no masses, lesions, or scarring.   Facial strength: Normal strength and symmetry, no synkinesis or facial tic.   Eyes   Pupils and irises: EOM intact, PERRLA, conjunctiva non-injected.   Ears   External inspection of ears: Ears normally formed and free of lesions.   Nose: Dorsum symmetric with no visible or palpable deformities.   External inspection of nose: No nasal lesions, lacerations, or scars. Nasal tip symmetrical with normal nasal valves.   Oral Cavity/Mouth   Lips, teeth, and gums: Normal lips, gums, and edentulous. Upper and lower dentures.  Oropharynx: Mucosa moist, no lesions. Hard and soft palate normal. Tongue normal, no lesions or edema. Tonsils normal, no lesions.   Neck: Symmetrical, trachea midline. No masses visible. Radiation related firmness of the neck.  Palpation of cervical lymph nodes: No palpable lymph node enlargement, no  submandibular adenopathy, no anterior cervical adenopathy, no supraclavicular adenopathy.   Neurological/Psychiatric   Cranial Nerve Examination: II - XII grossly intact.   Orientation to person, place, and time: Normal.   Mood and affect: Normal.   Skin: Normal without rashes or lesions.   Pulmonary   Respiratory effort: Chest expands symmetrically.   Cardiovascular: Good peripheral pulses   Peripheral vascular system: No varicosities, carotid pulse normal, no edema. No jugular venous distension.   Extremities Appearance of extremities: Normal. Gait normal.      Procedure  Procedure note: Recommended flexible nasopharyngoscopy. Risks, benefits, and alternatives were explained.   Procedure: Flexible nasopharyngoscopy   Indications: Head and neck cancer history  Anesthesia: 4% lidocaine and 0.5% phenylephrine   After adequate Afrin and lidocaine spray advance the flexible endoscope. I was able to visualize the nasal cavity and nasopharynx. The findings were notable for the following:  No masses/lesions/ulcers. The right vocal cord is completely immobile- this is stable from prior to treatment. There is no evidence of mucosal lesion or mass. He does have evidence of some anterior subglottic stenosis but he has an adequately patent airway posteriorly. No concerning lesions or masses. No pooling of secretions.     Impression/Plan:  74 y.o. male presenting to [facility] with a T3N0M0 laryngeal squamous cell carcinoma. He underwent triple endoscopy, tracheostomy, and PEG tube placement on 1/10/2022. The patient completed his chemoradiation on 4/20/2022. No evidence of disease today. He seems to also be doing well from a colorectal cancer standpoint.     -The patient will continue to follow-up with gastroenterology as well as colorectal surgery  -The patient is due for a TSH and chest imaging in September  -We will plan for follow-up in 4 months

## 2024-06-11 ENCOUNTER — HOSPITAL ENCOUNTER (OUTPATIENT)
Dept: RADIOLOGY | Facility: HOSPITAL | Age: 74
Discharge: HOME | End: 2024-06-11
Payer: MEDICARE

## 2024-06-11 DIAGNOSIS — C76.0 MALIGNANT NEOPLASM OF HEAD, FACE AND NECK (MULTI): ICD-10-CM

## 2024-06-11 DIAGNOSIS — Z03.89 OBSERVATION FOR SUSPECTED CANCER: ICD-10-CM

## 2024-06-11 PROCEDURE — 71046 X-RAY EXAM CHEST 2 VIEWS: CPT | Performed by: RADIOLOGY

## 2024-06-11 PROCEDURE — 71046 X-RAY EXAM CHEST 2 VIEWS: CPT

## 2024-07-15 ASSESSMENT — ENCOUNTER SYMPTOMS
FATIGUE: 0
UNEXPECTED WEIGHT CHANGE: 0
DIAPHORESIS: 0
APPETITE CHANGE: 0
FEVER: 0
CHILLS: 0
ACTIVITY CHANGE: 0

## 2024-07-15 NOTE — PROGRESS NOTES
DANY Naranjo is a 74 y.o. male who was diagnosed with T3N0M0 laryngeal scc after he presented to ENT 12/2021 with hoarseness and neck pain x 6 months. He underwent direct laryngoscopy with biopsy, bronchoscopy, esophagoscopy, tracheostomy with Dr. Guerin and PEG placed by Dr. Walters on 1/10/2022. He completed chemoradiation 4/8/22. He was decannulated 5/5/22 by Dr. Guerin.      He was admitted 7/28-8/5 for covid pneumonia, dysphagia, and nausea. GI was consulted and performed EGD on 8/2; however, procedure was aborted due to inability to traverse the upper esophageal sphincter due to complete stenosis/possible radiation-induced stricture. Repeat EGD with fluoroscopy was performed 8/3 with esophageal dilation and stent placed. PET CT on 7/29 showed hypermetabolic masses within the sigmoid colon and hepatic flexure concerning for multifocal colonic malignancy. Colonoscopy performed 8/2 demonstrating a polypoid non-obstructing large mass was in the rectosigmoid at 15cm. Biopsies taken and path consistent with moderately differentiated invasive adenocarcinoma.     He reports he is overall doing ok. He is trying to increase energy daily. He has a bm daily. He denies ay issues with bowel habits, occasional urgency but not bothered by it. Working out in yard.     MRI prostate demonstrated a PIRAD 5 lesion. He underwent biopsy of the lesion which was negative. Plans to monitor with PSA levels. He is s/p open LAR on 1/4/23. Anastomosis about 8cm from anal verge. Path T2N1a. One tumor deposit in the perirectal soft tissue. He was started on CapeOx on 3/6 with plans for 4 cycles. He was admitted in March with dehydration after diarrhea, nausea and vomiting x3 days after his chemo and chemo was stopped.     Due for CT and CEA in September, needs a flex sig today. He is 1.5 years out from surgery.     CEA 8/30/22: 1.7; 2/2023 1.6; 4/2023 2.0; 7/2023 1.8; 11/2023: 2.1; 3/2024 2.2     PET/CT Head & Neck staging 7/29/22:  Hypermetabolic masses within the sigmoid colon and hepatic flexure. This is concerning for multifocal colonic malignancy, recommend correlation with colonoscopy and tissue sampling. Resolution of the patient's hypermetabolic laryngeal mass and cervical lymphadenopathy, no residual hypermetabolic activity in this area to suggest locally recurrent/residual malignancy. Multifocal hypermetabolic consolidations of the bilateral lower lobes, in a pattern concerning for infectious inflammatory process such as aspiration pneumonia. Recommend correlation with clinical concern of infection and clinical findings of aspiration. Hypermetabolic focus within the left parotid gland, this is not completely characterized and differential possibilities include Warthin's tumor. Correlation with nonemergent parotid ultrasound may be of value.     CT angio chest 9/28/23: Since 3 days earlier, interval development of moderate bilateral lung infiltrates suggesting pneumonia, most pronounced in the right lower lobe. New small right pleural effusion. No detectable pulmonary emboli. Remainder as above.    CT a/p 9/25/2023: Bowel wall thickening in the visualized portions of the transverse and descending colon with adjacent pericolonic stranding suggestive of colitis. Other portions of the colon are not well seen due to lack of distention. Periportal edema. Prostatomegaly. Chronic pancreatitis. Mild centrilobular pulmonary emphysematous changes.     MRI Rectum 8/30/22: Polypoid intraluminal mass measuring 3.6 cm in maximal dimension, located 10.1 cm above the level of the anal verge, as discussed above, consistent with the known adenocarcinoma. No extraluminal tumor signal is demonstrated; most of the mass is located in an area above the level of the peritoneal reflection. No mesorectal or other pelvic lymphadenopathy is demonstrated. Nodular focus of diffusion restriction in the right aspect of the peripheral zone of the prostate, at the  level of the mid gland (as discussed above). A prostate carcinoma is not excluded on the basis of this finding; dedicated contrast-enhanced MRI of the prostate is recommended for further evaluation. MRI based staging is: T1/2 N0.     Colonoscopy 1/4/24 ([facility]): Healthy end-to-end colorectal anastomosis in the rectosigmoid 15 cm from the anal verge. Normal. Repeat colonoscopy in 3 years       No past medical history on file.    Past Surgical History:   Procedure Laterality Date    OTHER SURGICAL HISTORY  08/18/2022    Shoulder surgery       No Known Allergies    Review of Systems   Constitutional:  Negative for activity change, appetite change, chills, diaphoresis, fatigue, fever and unexpected weight change.   All other systems reviewed and are negative.      Physical Exam  Constitutional:       Appearance: Normal appearance.   Abdominal:      General: Abdomen is flat.      Palpations: Abdomen is soft.      Comments: Midline scar well healed, no hernia   Neurological:      Mental Status: He is alert.     General    Date/Time: 7/26/2024 9:40 AM    Performed by: Zabrina Orozco MD  Authorized by: Zabrina Orozco MD    Consent:     Consent obtained:  Written    Consent given by:  Patient  Universal protocol:     Patient identity confirmed:  Verbally with patient  Indications:     Indications:  Rectal or sigmoid mass  Sedation:     Sedation type:  None  Procedure specific details:      Healthy anastomosis 8cm   Post-procedure details:     Procedure completion:  Tolerated well, no immediate complications        Assessment and Plan:   Doing well. CEA today, CT scan in September  Follow up with me 4 months.

## 2024-07-26 ENCOUNTER — APPOINTMENT (OUTPATIENT)
Dept: SURGERY | Facility: CLINIC | Age: 74
End: 2024-07-26
Payer: MEDICARE

## 2024-07-26 ENCOUNTER — LAB (OUTPATIENT)
Dept: LAB | Facility: LAB | Age: 74
End: 2024-07-26
Payer: MEDICARE

## 2024-07-26 VITALS
SYSTOLIC BLOOD PRESSURE: 144 MMHG | WEIGHT: 102 LBS | HEART RATE: 105 BPM | BODY MASS INDEX: 18.6 KG/M2 | DIASTOLIC BLOOD PRESSURE: 67 MMHG

## 2024-07-26 DIAGNOSIS — C19 RECTOSIGMOID CANCER (MULTI): ICD-10-CM

## 2024-07-26 LAB
CEA SERPL-MCNC: 1.7 UG/L
CREAT SERPL-MCNC: 0.87 MG/DL (ref 0.5–1.3)
EGFRCR SERPLBLD CKD-EPI 2021: 90 ML/MIN/1.73M*2

## 2024-07-26 PROCEDURE — 36415 COLL VENOUS BLD VENIPUNCTURE: CPT

## 2024-07-26 PROCEDURE — 45341 SIGMOIDOSCOPY W/ULTRASOUND: CPT | Performed by: SURGERY

## 2024-07-26 PROCEDURE — 82565 ASSAY OF CREATININE: CPT

## 2024-07-26 PROCEDURE — 82378 CARCINOEMBRYONIC ANTIGEN: CPT

## 2024-07-26 PROCEDURE — 99213 OFFICE O/P EST LOW 20 MIN: CPT | Mod: 24 | Performed by: SURGERY

## 2024-08-06 ENCOUNTER — APPOINTMENT (OUTPATIENT)
Dept: OTOLARYNGOLOGY | Facility: HOSPITAL | Age: 74
End: 2024-08-06
Payer: MEDICARE

## 2024-09-03 ENCOUNTER — HOSPITAL ENCOUNTER (OUTPATIENT)
Dept: RADIOLOGY | Facility: HOSPITAL | Age: 74
Discharge: HOME | End: 2024-09-03
Payer: MEDICARE

## 2024-09-03 DIAGNOSIS — C19 RECTOSIGMOID CANCER (MULTI): ICD-10-CM

## 2024-09-03 PROCEDURE — A9698 NON-RAD CONTRAST MATERIALNOC: HCPCS | Performed by: SURGERY

## 2024-09-03 PROCEDURE — 2550000001 HC RX 255 CONTRASTS: Performed by: SURGERY

## 2024-09-03 PROCEDURE — 74177 CT ABD & PELVIS W/CONTRAST: CPT | Performed by: STUDENT IN AN ORGANIZED HEALTH CARE EDUCATION/TRAINING PROGRAM

## 2024-09-03 PROCEDURE — 71260 CT THORAX DX C+: CPT | Performed by: STUDENT IN AN ORGANIZED HEALTH CARE EDUCATION/TRAINING PROGRAM

## 2024-09-03 PROCEDURE — 74177 CT ABD & PELVIS W/CONTRAST: CPT

## 2024-10-01 ENCOUNTER — APPOINTMENT (OUTPATIENT)
Dept: OTOLARYNGOLOGY | Facility: HOSPITAL | Age: 74
End: 2024-10-01
Payer: MEDICARE

## 2024-11-26 NOTE — PROGRESS NOTES
History Of Present Illness  Jeff Naranjo is a 74 y.o. male presents for follow up surveillance of laryngeal cancer.       74 y.o. male presents to [facility] with a T3N0M0 laryngeal squamous cell carcinoma. He underwent triple endoscopy, tracheostomy, and PEG tube placement on 1/10/2022. The patient completed his chemoradiation on 4/20/2022.      The patients course has been complicated by complete stenosis of the upper esophagus s/p dilation and stent placement on 8/3/2022 as well as a new finding of a sigmoid adenocarcinoma. In addition, during work-up for her his colon issues he was also found to have a prostate mass for which he is seeing urology. Work-up of the prostate was negative for malignancy. He is now status post surgery (with Dr. Fallon) for his rectal cancer.      04/04/24- Last seen by Dr Guerin   The patient presents today for follow-up.  He has been doing fine from a laryngeal cancer standpoint.  He still continues to have some dysphagia and some tightness around his neck, which I think is radiation related.  He is following up with his gastroenterologist and colorectal surgeon.  He seems to be doing well from a rectal cancer standpoint.  He continues to tolerate a diet and his weight is stable.  He denies any voice or breathing difficulties.    Today,   Denies acute changes. Endorses fluctuating dysphagia with certain foods but overall manageable. Discussed xerostomia- currntly drinks a lot of water hasn't tried any mouth rinse. No new pain, sores, neck mass, etc.     Lab Results   Component Value Date    TSH 3.68 04/04/2024       Past Medical History  No past medical history on file.      Surgical History  Past Surgical History:   Procedure Laterality Date    OTHER SURGICAL HISTORY  08/18/2022    Shoulder surgery        Social History  He reports that he has been smoking cigarettes. He does not have any smokeless tobacco history on file. He reports that he  does not drink alcohol and does not use drugs.    Family History  No family history on file.     Allergies  Patient has no known allergies.     Physical Exam:  CONSTITUTIONAL:  No acute distress  VOICE:  mild-moderate hoarseness   RESPIRATION:  Breathing comfortably, no stridor  CV:  No clubbing/cyanosis/edema in hands  EYES:  EOM intact, sclera normal  NEURO:  Alert and oriented times 3, Cranial nerves II-XII grossly intact and symmetric bilaterally  HEAD AND FACE:  Symmetric facial features, no masses or lesion  SALIVARY GLANDS:  Parotid and submandibular glands normal bilaterally  EARS:  Normal external ears, external auditory canals, and TMs to otoscopy, normal hearing to conversational voice.  NOSE:  External nose midline, anterior rhinoscopy is normal with limited visualization to the anterior aspect of the interior turbinates, no bleeding or drainage, no lesions  ORAL CAVITY/OROPHARYNX/LIPS:  Normal mucous membranes, normal floor of mouth/tongue/OP, no masses or lesions  PHARYNGEAL WALLS:  No masses or lesions  NECK/LYMPH:  Neck skin with postradiation changes, fibrosis, no LAD  SKIN:  Neck skin is without scar or injury  PSYCH:  Alert and oriented with appropriate mood and affect    Procedure Note: Flexible Nasolaryngoscopy  Verbal informed consent was obtained from the patient/patient's guardian. 4% lidocaine mixed with phenylephrine was prepared and dripped into the nose. It was placed in the right naris. Following an appropriate amount of time to allow for adequate anesthesia, a flexible fiberoptic nasolaryngoscope was placed into the patient's right naris. The nasal cavity, nasopharynx, oropharynx, hypopharynx, and all endolaryngeal structures were visualized and were normal except as listed below. Significant findings included:  -thick secretions  at larynx no masses or lesions   - VC mobile bilaterally     Assessment and Plan  74 y.o. male with T3N0M0 laryngeal squamous cell carcinoma s/p definitive  CXRT completed 4/2022.     Scope today SHELL   Thyroid testing  yearly- 04/24 TSH wnl   Dysphagia- managing current diet,   Xerostomia - Recommend adding biotene    Ashley Hoang MD

## 2024-11-27 ASSESSMENT — ENCOUNTER SYMPTOMS
FATIGUE: 0
FEVER: 0
ACTIVITY CHANGE: 0
UNEXPECTED WEIGHT CHANGE: 0
APPETITE CHANGE: 0
DIAPHORESIS: 0
CHILLS: 0

## 2024-11-27 NOTE — PROGRESS NOTES
HPI    Jeff Naranjo is a 74 y.o. male who was diagnosed with T3N0M0 laryngeal scc after he presented to ENT 12/2021 with hoarseness and neck pain x 6 months. He underwent direct laryngoscopy with biopsy, bronchoscopy, esophagoscopy, tracheostomy with Dr. Guerin and PEG placed by Dr. Walters on 1/10/2022. He completed chemoradiation 4/8/22. He was decannulated 5/5/22 by Dr. Guerin.      He was admitted 7/28-8/5 for covid pneumonia, dysphagia, and nausea. GI was consulted and performed EGD on 8/2; however, procedure was aborted due to inability to traverse the upper esophageal sphincter due to complete stenosis/possible radiation-induced stricture. Repeat EGD with fluoroscopy was performed 8/3 with esophageal dilation and stent placed. PET CT on 7/29 showed hypermetabolic masses within the sigmoid colon and hepatic flexure concerning for multifocal colonic malignancy. Colonoscopy performed 8/2 demonstrating a polypoid non-obstructing large mass was in the rectosigmoid at 15cm. Biopsies taken and path consistent with moderately differentiated invasive adenocarcinoma.    MRI prostate demonstrated a PIRAD 5 lesion. He underwent biopsy of the lesion which was negative. Plans to monitor with PSA levels. He is s/p open LAR on 1/4/23. Anastomosis about 8cm from anal verge. Path T2N1a. One tumor deposit in the perirectal soft tissue. He was started on CapeOx on 3/6 with plans for 4 cycles. He was admitted in March with dehydration after diarrhea, nausea and vomiting x3 days after his chemo and chemo was stopped.     Today reports constipation, hard stools, difficulty passing stools. Drinks very little water, low fiber diet. Otherwise doing well        CEA 8/30/22: 1.7; 2/2023 1.6; 4/2023 2.0; 7/2023 1.8; 11/2023: 2.1; 3/2024 2.2; 7/2024: 1.7     CT c/a/p 9/3/24:   CHEST:  1. New (from 2022) left apical pulmonary nodule measuring 0.4 cm. Finding is indeterminate and could be infectious, inflammatory, however metastasis could  not be entirely excluded. For clinical correlation and attention on short-term follow-up.  2. No enlarged intrathoracic lymphadenopathy  ABDOMEN-PELVIS:  1. Status post sigmoidectomy with no gross soft tissue mass at the surgical bed or metastatic disease in the abdomen or pelvis.  2. Stable changes of chronic pancreatitis.     MRI Rectum 8/30/22: Polypoid intraluminal mass measuring 3.6 cm in maximal dimension, located 10.1 cm above the level of the anal verge, as discussed above, consistent with the known adenocarcinoma. No extraluminal tumor signal is demonstrated; most of the mass is located in an area above the level of the peritoneal reflection. No mesorectal or other pelvic lymphadenopathy is demonstrated. Nodular focus of diffusion restriction in the right aspect of the peripheral zone of the prostate, at the level of the mid gland (as discussed above). A prostate carcinoma is not excluded on the basis of this finding; dedicated contrast-enhanced MRI of the prostate is recommended for further evaluation. MRI based staging is: T1/2 N0.     Colonoscopy 1/4/24 ([facility]): Healthy end-to-end colorectal anastomosis in the rectosigmoid 15 cm from the anal verge. Normal. Repeat colonoscopy in 3 years       No past medical history on file.    Past Surgical History:   Procedure Laterality Date    OTHER SURGICAL HISTORY  08/18/2022    Shoulder surgery       No Known Allergies    Review of Systems   Constitutional:  Negative for activity change, appetite change, chills, diaphoresis, fatigue, fever and unexpected weight change.   All other systems reviewed and are negative.      Physical Exam  Constitutional:       Appearance: Normal appearance.   Abdominal:      General: Abdomen is flat.      Palpations: Abdomen is soft.      Comments: Midline scar well healed, no hernia   Neurological:      Mental Status: He is alert.     Flexible Signmoidoscopy In Clinic    Date/Time: 12/3/2024 9:07 AM    Performed by: Zabrina SAM  MD Pam  Authorized by: Zabrina Orozco MD  Indications comment: Rectosigmoid cancer    Sedation:  Patient sedated: no    Scope type: flexible sigmoidoscope  Distance inserted: 20 cm  Prep quality: fair  External exam performed: yes  Digital exam performed: yes  Procedure termination: procedure complete  Patient tolerance: patient tolerated the procedure well with no immediate complications            Assessment and Plan:   Doing well. CEA today, CT chest   Discussed fiber and water intake to improve bowel function  Follow up with me 4 months.

## 2024-12-03 ENCOUNTER — OFFICE VISIT (OUTPATIENT)
Dept: OTOLARYNGOLOGY | Facility: HOSPITAL | Age: 74
End: 2024-12-03
Payer: MEDICARE

## 2024-12-03 ENCOUNTER — LAB (OUTPATIENT)
Dept: LAB | Facility: LAB | Age: 74
End: 2024-12-03
Payer: MEDICARE

## 2024-12-03 ENCOUNTER — OFFICE VISIT (OUTPATIENT)
Dept: SURGERY | Facility: CLINIC | Age: 74
End: 2024-12-03
Payer: MEDICARE

## 2024-12-03 VITALS
HEART RATE: 53 BPM | DIASTOLIC BLOOD PRESSURE: 80 MMHG | SYSTOLIC BLOOD PRESSURE: 122 MMHG | BODY MASS INDEX: 20.61 KG/M2 | WEIGHT: 113 LBS

## 2024-12-03 DIAGNOSIS — C18.7 MALIGNANT NEOPLASM OF SIGMOID COLON (MULTI): ICD-10-CM

## 2024-12-03 DIAGNOSIS — K59.09 OTHER CONSTIPATION: Primary | ICD-10-CM

## 2024-12-03 DIAGNOSIS — E03.9 ACQUIRED HYPOTHYROIDISM: Primary | ICD-10-CM

## 2024-12-03 LAB
ALBUMIN SERPL BCP-MCNC: 4.5 G/DL (ref 3.4–5)
ANION GAP SERPL CALC-SCNC: 13 MMOL/L (ref 10–20)
BUN SERPL-MCNC: 17 MG/DL (ref 6–23)
CALCIUM SERPL-MCNC: 9.9 MG/DL (ref 8.6–10.6)
CEA SERPL-MCNC: 2.4 UG/L
CHLORIDE SERPL-SCNC: 102 MMOL/L (ref 98–107)
CO2 SERPL-SCNC: 29 MMOL/L (ref 21–32)
CREAT SERPL-MCNC: 0.86 MG/DL (ref 0.5–1.3)
EGFRCR SERPLBLD CKD-EPI 2021: 90 ML/MIN/1.73M*2
GLUCOSE SERPL-MCNC: 101 MG/DL (ref 74–99)
PHOSPHATE SERPL-MCNC: 2.8 MG/DL (ref 2.5–4.9)
POTASSIUM SERPL-SCNC: 4.4 MMOL/L (ref 3.5–5.3)
SODIUM SERPL-SCNC: 140 MMOL/L (ref 136–145)

## 2024-12-03 PROCEDURE — 36415 COLL VENOUS BLD VENIPUNCTURE: CPT

## 2024-12-03 PROCEDURE — 80069 RENAL FUNCTION PANEL: CPT

## 2024-12-03 PROCEDURE — 99214 OFFICE O/P EST MOD 30 MIN: CPT | Performed by: SURGERY

## 2024-12-03 PROCEDURE — 99214 OFFICE O/P EST MOD 30 MIN: CPT | Performed by: STUDENT IN AN ORGANIZED HEALTH CARE EDUCATION/TRAINING PROGRAM

## 2024-12-03 PROCEDURE — 1157F ADVNC CARE PLAN IN RCRD: CPT | Performed by: SURGERY

## 2024-12-03 PROCEDURE — 1159F MED LIST DOCD IN RCRD: CPT | Performed by: SURGERY

## 2024-12-03 PROCEDURE — 99214 OFFICE O/P EST MOD 30 MIN: CPT | Mod: 24,25 | Performed by: SURGERY

## 2024-12-03 PROCEDURE — 1157F ADVNC CARE PLAN IN RCRD: CPT | Performed by: STUDENT IN AN ORGANIZED HEALTH CARE EDUCATION/TRAINING PROGRAM

## 2024-12-03 PROCEDURE — 31575 DIAGNOSTIC LARYNGOSCOPY: CPT | Performed by: STUDENT IN AN ORGANIZED HEALTH CARE EDUCATION/TRAINING PROGRAM

## 2024-12-03 PROCEDURE — 82378 CARCINOEMBRYONIC ANTIGEN: CPT

## 2024-12-03 PROCEDURE — 1159F MED LIST DOCD IN RCRD: CPT | Performed by: STUDENT IN AN ORGANIZED HEALTH CARE EDUCATION/TRAINING PROGRAM

## 2024-12-03 ASSESSMENT — PATIENT HEALTH QUESTIONNAIRE - PHQ9
2. FEELING DOWN, DEPRESSED OR HOPELESS: NOT AT ALL
SUM OF ALL RESPONSES TO PHQ9 QUESTIONS 1 AND 2: 0
1. LITTLE INTEREST OR PLEASURE IN DOING THINGS: NOT AT ALL

## 2024-12-17 ENCOUNTER — HOSPITAL ENCOUNTER (OUTPATIENT)
Dept: RADIOLOGY | Facility: HOSPITAL | Age: 74
End: 2024-12-17
Payer: MEDICARE

## 2024-12-26 ENCOUNTER — APPOINTMENT (OUTPATIENT)
Dept: RADIOLOGY | Facility: HOSPITAL | Age: 74
End: 2024-12-26
Payer: MEDICARE

## 2024-12-31 ENCOUNTER — HOSPITAL ENCOUNTER (OUTPATIENT)
Dept: RADIOLOGY | Facility: HOSPITAL | Age: 74
Discharge: HOME | End: 2024-12-31
Payer: MEDICARE

## 2024-12-31 DIAGNOSIS — C18.7 MALIGNANT NEOPLASM OF SIGMOID COLON (MULTI): ICD-10-CM

## 2024-12-31 PROCEDURE — 2550000001 HC RX 255 CONTRASTS: Performed by: SURGERY

## 2024-12-31 PROCEDURE — 71260 CT THORAX DX C+: CPT

## 2024-12-31 PROCEDURE — 71260 CT THORAX DX C+: CPT | Performed by: RADIOLOGY

## 2024-12-31 RX ADMIN — IOHEXOL 50 ML: 350 INJECTION, SOLUTION INTRAVENOUS at 10:08

## 2025-04-05 ENCOUNTER — APPOINTMENT (OUTPATIENT)
Dept: RADIOLOGY | Facility: HOSPITAL | Age: 75
End: 2025-04-05
Payer: MEDICARE

## 2025-04-05 ENCOUNTER — APPOINTMENT (OUTPATIENT)
Dept: CARDIOLOGY | Facility: HOSPITAL | Age: 75
End: 2025-04-05
Payer: MEDICARE

## 2025-04-05 ENCOUNTER — HOSPITAL ENCOUNTER (EMERGENCY)
Facility: HOSPITAL | Age: 75
Discharge: HOME | End: 2025-04-05
Attending: STUDENT IN AN ORGANIZED HEALTH CARE EDUCATION/TRAINING PROGRAM
Payer: MEDICARE

## 2025-04-05 VITALS
OXYGEN SATURATION: 96 % | DIASTOLIC BLOOD PRESSURE: 63 MMHG | RESPIRATION RATE: 18 BRPM | SYSTOLIC BLOOD PRESSURE: 109 MMHG | WEIGHT: 115 LBS | BODY MASS INDEX: 21.16 KG/M2 | HEART RATE: 80 BPM | TEMPERATURE: 100 F | HEIGHT: 62 IN

## 2025-04-05 DIAGNOSIS — J10.1 INFLUENZA A: ICD-10-CM

## 2025-04-05 DIAGNOSIS — R11.10 VOMITING AND DIARRHEA: Primary | ICD-10-CM

## 2025-04-05 DIAGNOSIS — R19.7 VOMITING AND DIARRHEA: Primary | ICD-10-CM

## 2025-04-05 LAB
ALBUMIN SERPL BCP-MCNC: 4 G/DL (ref 3.4–5)
ALP SERPL-CCNC: 94 U/L (ref 33–136)
ALT SERPL W P-5'-P-CCNC: 23 U/L (ref 10–52)
ANION GAP SERPL CALC-SCNC: 13 MMOL/L (ref 10–20)
AST SERPL W P-5'-P-CCNC: 31 U/L (ref 9–39)
BASOPHILS # BLD AUTO: 0.06 X10*3/UL (ref 0–0.1)
BASOPHILS NFR BLD AUTO: 1.1 %
BILIRUB SERPL-MCNC: 0.5 MG/DL (ref 0–1.2)
BUN SERPL-MCNC: 13 MG/DL (ref 6–23)
CALCIUM SERPL-MCNC: 8.9 MG/DL (ref 8.6–10.3)
CHLORIDE SERPL-SCNC: 97 MMOL/L (ref 98–107)
CO2 SERPL-SCNC: 24 MMOL/L (ref 21–32)
CREAT SERPL-MCNC: 0.82 MG/DL (ref 0.5–1.3)
EGFRCR SERPLBLD CKD-EPI 2021: >90 ML/MIN/1.73M*2
EOSINOPHIL # BLD AUTO: 0.12 X10*3/UL (ref 0–0.4)
EOSINOPHIL NFR BLD AUTO: 2.2 %
ERYTHROCYTE [DISTWIDTH] IN BLOOD BY AUTOMATED COUNT: 14.1 % (ref 11.5–14.5)
FLUAV RNA RESP QL NAA+PROBE: DETECTED
FLUBV RNA RESP QL NAA+PROBE: NOT DETECTED
GLUCOSE SERPL-MCNC: 122 MG/DL (ref 74–99)
HCT VFR BLD AUTO: 37 % (ref 41–52)
HGB BLD-MCNC: 13.2 G/DL (ref 13.5–17.5)
IMM GRANULOCYTES # BLD AUTO: 0.02 X10*3/UL (ref 0–0.5)
IMM GRANULOCYTES NFR BLD AUTO: 0.4 % (ref 0–0.9)
LIPASE SERPL-CCNC: 117 U/L (ref 9–82)
LYMPHOCYTES # BLD AUTO: 0.75 X10*3/UL (ref 0.8–3)
LYMPHOCYTES NFR BLD AUTO: 14 %
MCH RBC QN AUTO: 32.2 PG (ref 26–34)
MCHC RBC AUTO-ENTMCNC: 35.7 G/DL (ref 32–36)
MCV RBC AUTO: 90 FL (ref 80–100)
MONOCYTES # BLD AUTO: 0.91 X10*3/UL (ref 0.05–0.8)
MONOCYTES NFR BLD AUTO: 17 %
NEUTROPHILS # BLD AUTO: 3.48 X10*3/UL (ref 1.6–5.5)
NEUTROPHILS NFR BLD AUTO: 65.3 %
NRBC BLD-RTO: 0 /100 WBCS (ref 0–0)
PLATELET # BLD AUTO: 164 X10*3/UL (ref 150–450)
POTASSIUM SERPL-SCNC: 3.7 MMOL/L (ref 3.5–5.3)
PROT SERPL-MCNC: 6.8 G/DL (ref 6.4–8.2)
RBC # BLD AUTO: 4.1 X10*6/UL (ref 4.5–5.9)
RSV RNA RESP QL NAA+PROBE: NOT DETECTED
SARS-COV-2 RNA RESP QL NAA+PROBE: NOT DETECTED
SODIUM SERPL-SCNC: 130 MMOL/L (ref 136–145)
WBC # BLD AUTO: 5.3 X10*3/UL (ref 4.4–11.3)

## 2025-04-05 PROCEDURE — 96375 TX/PRO/DX INJ NEW DRUG ADDON: CPT

## 2025-04-05 PROCEDURE — 2500000004 HC RX 250 GENERAL PHARMACY W/ HCPCS (ALT 636 FOR OP/ED): Performed by: STUDENT IN AN ORGANIZED HEALTH CARE EDUCATION/TRAINING PROGRAM

## 2025-04-05 PROCEDURE — 96361 HYDRATE IV INFUSION ADD-ON: CPT

## 2025-04-05 PROCEDURE — 99285 EMERGENCY DEPT VISIT HI MDM: CPT | Performed by: STUDENT IN AN ORGANIZED HEALTH CARE EDUCATION/TRAINING PROGRAM

## 2025-04-05 PROCEDURE — 36415 COLL VENOUS BLD VENIPUNCTURE: CPT

## 2025-04-05 PROCEDURE — 85025 COMPLETE CBC W/AUTO DIFF WBC: CPT

## 2025-04-05 PROCEDURE — 71045 X-RAY EXAM CHEST 1 VIEW: CPT | Performed by: RADIOLOGY

## 2025-04-05 PROCEDURE — 87637 SARSCOV2&INF A&B&RSV AMP PRB: CPT

## 2025-04-05 PROCEDURE — 2500000004 HC RX 250 GENERAL PHARMACY W/ HCPCS (ALT 636 FOR OP/ED)

## 2025-04-05 PROCEDURE — 83690 ASSAY OF LIPASE: CPT

## 2025-04-05 PROCEDURE — 71045 X-RAY EXAM CHEST 1 VIEW: CPT

## 2025-04-05 PROCEDURE — 80053 COMPREHEN METABOLIC PANEL: CPT

## 2025-04-05 PROCEDURE — 96374 THER/PROPH/DIAG INJ IV PUSH: CPT

## 2025-04-05 PROCEDURE — 93005 ELECTROCARDIOGRAM TRACING: CPT

## 2025-04-05 RX ORDER — METOCLOPRAMIDE 10 MG/1
10 TABLET ORAL EVERY 6 HOURS
Qty: 28 TABLET | Refills: 0 | Status: SHIPPED | OUTPATIENT
Start: 2025-04-05 | End: 2025-04-12

## 2025-04-05 RX ORDER — METOCLOPRAMIDE HYDROCHLORIDE 5 MG/ML
10 INJECTION INTRAMUSCULAR; INTRAVENOUS ONCE
Status: COMPLETED | OUTPATIENT
Start: 2025-04-05 | End: 2025-04-05

## 2025-04-05 RX ORDER — KETOROLAC TROMETHAMINE 15 MG/ML
INJECTION, SOLUTION INTRAMUSCULAR; INTRAVENOUS
Status: COMPLETED
Start: 2025-04-05 | End: 2025-04-05

## 2025-04-05 RX ORDER — ONDANSETRON HYDROCHLORIDE 2 MG/ML
4 INJECTION, SOLUTION INTRAVENOUS ONCE
Status: COMPLETED | OUTPATIENT
Start: 2025-04-05 | End: 2025-04-05

## 2025-04-05 RX ORDER — KETOROLAC TROMETHAMINE 15 MG/ML
15 INJECTION, SOLUTION INTRAMUSCULAR; INTRAVENOUS ONCE
Status: COMPLETED | OUTPATIENT
Start: 2025-04-05 | End: 2025-04-05

## 2025-04-05 RX ORDER — DIPHENHYDRAMINE HYDROCHLORIDE 50 MG/ML
25 INJECTION, SOLUTION INTRAMUSCULAR; INTRAVENOUS ONCE
Status: COMPLETED | OUTPATIENT
Start: 2025-04-05 | End: 2025-04-05

## 2025-04-05 RX ADMIN — KETOROLAC TROMETHAMINE 15 MG: 15 INJECTION, SOLUTION INTRAMUSCULAR; INTRAVENOUS at 12:23

## 2025-04-05 RX ADMIN — DIPHENHYDRAMINE HYDROCHLORIDE 25 MG: 50 INJECTION, SOLUTION INTRAMUSCULAR; INTRAVENOUS at 12:22

## 2025-04-05 RX ADMIN — METOCLOPRAMIDE 10 MG: 5 INJECTION, SOLUTION INTRAMUSCULAR; INTRAVENOUS at 12:22

## 2025-04-05 RX ADMIN — SODIUM CHLORIDE, SODIUM LACTATE, POTASSIUM CHLORIDE, AND CALCIUM CHLORIDE 1000 ML: .6; .31; .03; .02 INJECTION, SOLUTION INTRAVENOUS at 11:44

## 2025-04-05 RX ADMIN — ONDANSETRON 4 MG: 2 INJECTION, SOLUTION INTRAMUSCULAR; INTRAVENOUS at 11:46

## 2025-04-05 ASSESSMENT — PAIN SCALES - GENERAL: PAINLEVEL_OUTOF10: 0 - NO PAIN

## 2025-04-05 ASSESSMENT — PAIN - FUNCTIONAL ASSESSMENT: PAIN_FUNCTIONAL_ASSESSMENT: 0-10

## 2025-04-05 ASSESSMENT — COLUMBIA-SUICIDE SEVERITY RATING SCALE - C-SSRS
1. IN THE PAST MONTH, HAVE YOU WISHED YOU WERE DEAD OR WISHED YOU COULD GO TO SLEEP AND NOT WAKE UP?: NO
6. HAVE YOU EVER DONE ANYTHING, STARTED TO DO ANYTHING, OR PREPARED TO DO ANYTHING TO END YOUR LIFE?: NO
2. HAVE YOU ACTUALLY HAD ANY THOUGHTS OF KILLING YOURSELF?: NO

## 2025-04-05 NOTE — ED TRIAGE NOTES
Pt BIB EMS from home for n/v/d/abd px. Cough and chills, fever per EMS. H/o polio. 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 Pt is oriented, speaking in full sentences. No belly tenderness.

## 2025-04-05 NOTE — ED PROVIDER NOTES
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  Daughter is at bedside providing supplemental history.  Patient states he developed intractable bilious but not bloody emesis earlier this morning.  Denies any associated chest pain, does state he has a minimal cough.  Denies any fevers but does endorse chills, generalized bodyaches, headache.  States he has a history of polio and cannot move his right leg due to this.  Otherwise denies any weakness, numbness, tingling, back pain, chest pain, difficulty breathing.  He denies any blood in his stool or emesis.  Ambulation and CT PE  I reviewed general surgery note from December 2024 when patient was seen for follow-up for malignant neoplasm of sigmoid colon.    External Records Reviewed:  I reviewed prior ED visits, Care Everywhere, discharge summaries and outpatient records as appropriate.   ???????????????????????????????????????????????????????????????  Triage Vitals:  T 37.8 °C (100 °F)  HR 76  /86  RR (!) 24  O2 96 % None (Room air)    Physical Exam  Vitals and nursing note reviewed.   Constitutional:       General: He is not in acute distress.     Appearance: Normal appearance.   HENT:      Head: Normocephalic and atraumatic.   Eyes:      Conjunctiva/sclera: Conjunctivae normal.   Cardiovascular:      Rate and Rhythm: Normal rate and regular rhythm.   Pulmonary:      Effort: Pulmonary effort is normal. No respiratory distress.      Breath sounds: Normal breath sounds.   Abdominal:      General: Abdomen is flat.      Palpations: Abdomen is soft.      Comments: Abdomen is soft, nontender, nondistended, no appreciable rebound or guarding   Musculoskeletal:         General: Normal range of motion.       Cervical back: Normal range of motion and neck supple. No rigidity.   Skin:     General: Skin is warm and dry.   Neurological:      Mental Status: He is alert and oriented to person, place, and time. Mental status is at baseline.      Comments: Unable to move right lower extremity which appears chronic, sensation intact in all extremities, normal speech, alert and oriented to person, place, time, no pronator drift, 5 of 5 strength in bilateral upper extremities, left lower extremity to antigravity, no midline C-spine tenderness to palpation   Psychiatric:         Mood and Affect: Mood normal.         Behavior: Behavior normal.        ???????????????????????????????????????????????????????????????  ED Course/Treatment/Medical Decision Making  MDM:  Patient is a 75-year-old male who presents with nausea, vomiting, diarrhea.  He is dry heaving during my evaluation, bilious emesis visualized in emesis bag however he is responding to questions and commands appropriately.  Vital signs notable for low-grade fever of 100 °F, heart rate is 76, and he is slightly tachypneic likely in the setting of dry heaving however he is overall hemodynamically stable.  Abdomen is soft, nontender, nondistended.  Differential diagnoses considered include biliary disease, intra-abdominal infection, small bowel obstruction, gastroenteritis, viral illness.  He states he is passing gas and having bowel movements and did have loose stool earlier today.  Patient's abdomen is soft, nontender, nondistended, do not clinically suspect acute intra-abdominal infection, small bowel obstruction warranting further imaging at this time.  Will obtain lab work and p.o. challenge patient.    ED Course:  ED Course as of 04/05/25 1401   Sat Apr 05, 2025   1152 EKG reviewed normal sinus rhythm with sinus arrhythmia rate 80, MS interval 124 ms, QRS 86 ms, QTc 422 ms, normal axis, no acute ST segment elevations or depressions, overall appears similar when  compared to prior [SA]   1210 CBC with leukocyte count of 5.3, hemoglobin 13.2 overall similar to baseline, platelets 164 [SA]   1216 /65 at bedside on my assessment documented 77/40 seems to be an error  [HD]   1218 Qtc 422 so will order reglan [HD]   1221 XR chest 1 view  IMPRESSION:  1.  No evidence of acute cardiopulmonary process.   [SA]   1232 Flu A Result(!): Detected  Patient is flu A positive [SA]   1232 Flu A Result(!): Detected [HD]   1308 XR chest 1 view  IMPRESSION:  1.  No evidence of acute cardiopulmonary process.       [SA]   1401 Patient tolerated p.o. [SA]      ED Course User Index  [HD] Rowan Bacon DO  [SA] Rekha Caraballo DO         Diagnoses as of 04/05/25 1401   Vomiting and diarrhea   Influenza A         EKG Interpretation:  See ED Course/Below:    Independent Interpretation of Studies:  I independently interpreted labs/imaging as stated in ED Course or below.    Differential diagnoses considered include but are not limited to: See MDM/Below:    Social Determinants Limiting Care:  None identified The following actions were taken to address these social determinants:      Discussion of Management with Other Providers: See MDM/Below:    Disposition:  Discussed differential and workup results including pertinent labs/imaging and any incidental findings if applicable. Patient will follow-up with the primary physician in the next 2-3 days. Return if worse. They understand return precautions and discharge instructions. They were given the opportunity to ask any questions. All of their questions were answered. Patient and family/friend/caregiver are in agreement with this plan.       CLARA Shabazz, PGY-3    I reviewed the case with the attending ED physician. The attending ED physician agrees with the plan. Patient and/or patient´s representative was counseled regarding labs, imaging, likely diagnosis, and plan. All questions were answered.    Disclaimer: This note was dictated  by speech recognition.  Attempt at proofreading was made to minimize errors.  Errors in transcription may be present.  Please call if questions.    Procedures ? [employer] last updated 4/5/2025 11:42 AM        Rekha Caraballo DO  Resident  04/05/25 0226

## 2025-04-07 LAB
ATRIAL RATE: 80 BPM
P OFFSET: 182 MS
P ONSET: 152 MS
PR INTERVAL: 124 MS
Q ONSET: 214 MS
QRS COUNT: 14 BEATS
QRS DURATION: 86 MS
QT INTERVAL: 366 MS
QTC CALCULATION(BAZETT): 422 MS
QTC FREDERICIA: 403 MS
R AXIS: -33 DEGREES
T AXIS: 73 DEGREES
T OFFSET: 397 MS
VENTRICULAR RATE: 80 BPM

## 2025-04-15 ENCOUNTER — APPOINTMENT (OUTPATIENT)
Dept: OTOLARYNGOLOGY | Facility: HOSPITAL | Age: 75
End: 2025-04-15
Payer: MEDICARE

## 2025-04-16 ENCOUNTER — HOSPITAL ENCOUNTER (EMERGENCY)
Facility: HOSPITAL | Age: 75
Discharge: HOME | End: 2025-04-16
Payer: MEDICARE

## 2025-04-16 ENCOUNTER — APPOINTMENT (OUTPATIENT)
Dept: RADIOLOGY | Facility: HOSPITAL | Age: 75
End: 2025-04-16
Payer: MEDICARE

## 2025-04-16 VITALS
RESPIRATION RATE: 18 BRPM | TEMPERATURE: 97 F | HEIGHT: 62 IN | DIASTOLIC BLOOD PRESSURE: 71 MMHG | OXYGEN SATURATION: 98 % | HEART RATE: 85 BPM | BODY MASS INDEX: 21.16 KG/M2 | SYSTOLIC BLOOD PRESSURE: 121 MMHG | WEIGHT: 115 LBS

## 2025-04-16 DIAGNOSIS — J18.9 PNEUMONIA OF BOTH LOWER LOBES DUE TO INFECTIOUS ORGANISM: Primary | ICD-10-CM

## 2025-04-16 LAB
ALBUMIN SERPL BCP-MCNC: 3.6 G/DL (ref 3.4–5)
ALP SERPL-CCNC: 87 U/L (ref 33–136)
ALT SERPL W P-5'-P-CCNC: 30 U/L (ref 10–52)
ANION GAP SERPL CALC-SCNC: 13 MMOL/L (ref 10–20)
APPEARANCE UR: CLEAR
AST SERPL W P-5'-P-CCNC: 27 U/L (ref 9–39)
BASOPHILS # BLD AUTO: 0.04 X10*3/UL (ref 0–0.1)
BASOPHILS NFR BLD AUTO: 0.4 %
BILIRUB SERPL-MCNC: 0.9 MG/DL (ref 0–1.2)
BILIRUB UR STRIP.AUTO-MCNC: NEGATIVE MG/DL
BUN SERPL-MCNC: 26 MG/DL (ref 6–23)
CALCIUM SERPL-MCNC: 9.2 MG/DL (ref 8.6–10.3)
CHLORIDE SERPL-SCNC: 102 MMOL/L (ref 98–107)
CO2 SERPL-SCNC: 28 MMOL/L (ref 21–32)
COLOR UR: YELLOW
CREAT SERPL-MCNC: 0.67 MG/DL (ref 0.5–1.3)
EGFRCR SERPLBLD CKD-EPI 2021: >90 ML/MIN/1.73M*2
EOSINOPHIL # BLD AUTO: 0.1 X10*3/UL (ref 0–0.4)
EOSINOPHIL NFR BLD AUTO: 0.9 %
ERYTHROCYTE [DISTWIDTH] IN BLOOD BY AUTOMATED COUNT: 14 % (ref 11.5–14.5)
GLUCOSE SERPL-MCNC: 96 MG/DL (ref 74–99)
GLUCOSE UR STRIP.AUTO-MCNC: NORMAL MG/DL
HCT VFR BLD AUTO: 36.6 % (ref 41–52)
HGB BLD-MCNC: 12.3 G/DL (ref 13.5–17.5)
IMM GRANULOCYTES # BLD AUTO: 0.14 X10*3/UL (ref 0–0.5)
IMM GRANULOCYTES NFR BLD AUTO: 1.3 % (ref 0–0.9)
KETONES UR STRIP.AUTO-MCNC: ABNORMAL MG/DL
LACTATE SERPL-SCNC: 1.2 MMOL/L (ref 0.4–2)
LEUKOCYTE ESTERASE UR QL STRIP.AUTO: NEGATIVE
LIPASE SERPL-CCNC: 23 U/L (ref 9–82)
LYMPHOCYTES # BLD AUTO: 1.05 X10*3/UL (ref 0.8–3)
LYMPHOCYTES NFR BLD AUTO: 10 %
MAGNESIUM SERPL-MCNC: 2.14 MG/DL (ref 1.6–2.4)
MCH RBC QN AUTO: 31.4 PG (ref 26–34)
MCHC RBC AUTO-ENTMCNC: 33.6 G/DL (ref 32–36)
MCV RBC AUTO: 93 FL (ref 80–100)
MONOCYTES # BLD AUTO: 0.95 X10*3/UL (ref 0.05–0.8)
MONOCYTES NFR BLD AUTO: 9 %
MUCOUS THREADS #/AREA URNS AUTO: NORMAL /LPF
NEUTROPHILS # BLD AUTO: 8.25 X10*3/UL (ref 1.6–5.5)
NEUTROPHILS NFR BLD AUTO: 78.4 %
NITRITE UR QL STRIP.AUTO: NEGATIVE
NRBC BLD-RTO: 0 /100 WBCS (ref 0–0)
PH UR STRIP.AUTO: 7 [PH]
PLATELET # BLD AUTO: 469 X10*3/UL (ref 150–450)
POTASSIUM SERPL-SCNC: 4.2 MMOL/L (ref 3.5–5.3)
PROT SERPL-MCNC: 7.5 G/DL (ref 6.4–8.2)
PROT UR STRIP.AUTO-MCNC: ABNORMAL MG/DL
RBC # BLD AUTO: 3.92 X10*6/UL (ref 4.5–5.9)
RBC # UR STRIP.AUTO: NEGATIVE MG/DL
RBC #/AREA URNS AUTO: NORMAL /HPF
SODIUM SERPL-SCNC: 139 MMOL/L (ref 136–145)
SP GR UR STRIP.AUTO: >1.05
SQUAMOUS #/AREA URNS AUTO: NORMAL /HPF
UROBILINOGEN UR STRIP.AUTO-MCNC: NORMAL MG/DL
WBC # BLD AUTO: 10.5 X10*3/UL (ref 4.4–11.3)
WBC #/AREA URNS AUTO: NORMAL /HPF

## 2025-04-16 PROCEDURE — 81001 URINALYSIS AUTO W/SCOPE: CPT | Performed by: PHYSICIAN ASSISTANT

## 2025-04-16 PROCEDURE — 2550000001 HC RX 255 CONTRASTS: Performed by: PHYSICIAN ASSISTANT

## 2025-04-16 PROCEDURE — 99285 EMERGENCY DEPT VISIT HI MDM: CPT | Mod: 25

## 2025-04-16 PROCEDURE — 36415 COLL VENOUS BLD VENIPUNCTURE: CPT | Performed by: PHYSICIAN ASSISTANT

## 2025-04-16 PROCEDURE — 83605 ASSAY OF LACTIC ACID: CPT | Performed by: PHYSICIAN ASSISTANT

## 2025-04-16 PROCEDURE — 71275 CT ANGIOGRAPHY CHEST: CPT

## 2025-04-16 PROCEDURE — 71275 CT ANGIOGRAPHY CHEST: CPT | Performed by: RADIOLOGY

## 2025-04-16 PROCEDURE — 85025 COMPLETE CBC W/AUTO DIFF WBC: CPT | Performed by: PHYSICIAN ASSISTANT

## 2025-04-16 PROCEDURE — 70491 CT SOFT TISSUE NECK W/DYE: CPT

## 2025-04-16 PROCEDURE — 83735 ASSAY OF MAGNESIUM: CPT | Performed by: PHYSICIAN ASSISTANT

## 2025-04-16 PROCEDURE — 96374 THER/PROPH/DIAG INJ IV PUSH: CPT | Mod: 59

## 2025-04-16 PROCEDURE — 2500000004 HC RX 250 GENERAL PHARMACY W/ HCPCS (ALT 636 FOR OP/ED): Performed by: PHYSICIAN ASSISTANT

## 2025-04-16 PROCEDURE — 2500000001 HC RX 250 WO HCPCS SELF ADMINISTERED DRUGS (ALT 637 FOR MEDICARE OP): Performed by: PHYSICIAN ASSISTANT

## 2025-04-16 PROCEDURE — 83690 ASSAY OF LIPASE: CPT | Performed by: PHYSICIAN ASSISTANT

## 2025-04-16 PROCEDURE — 84075 ASSAY ALKALINE PHOSPHATASE: CPT | Performed by: PHYSICIAN ASSISTANT

## 2025-04-16 PROCEDURE — 70491 CT SOFT TISSUE NECK W/DYE: CPT | Performed by: RADIOLOGY

## 2025-04-16 RX ORDER — AMOXICILLIN 250 MG/1
1000 CAPSULE ORAL ONCE
Status: COMPLETED | OUTPATIENT
Start: 2025-04-16 | End: 2025-04-16

## 2025-04-16 RX ORDER — AMOXICILLIN 500 MG/1
1000 CAPSULE ORAL 3 TIMES DAILY
Qty: 60 CAPSULE | Refills: 0 | Status: SHIPPED | OUTPATIENT
Start: 2025-04-16 | End: 2025-04-26

## 2025-04-16 RX ORDER — AZITHROMYCIN 250 MG/1
250 TABLET, FILM COATED ORAL DAILY
Qty: 4 TABLET | Refills: 0 | Status: SHIPPED | OUTPATIENT
Start: 2025-04-17 | End: 2025-04-21

## 2025-04-16 RX ORDER — AZITHROMYCIN 500 MG/1
500 TABLET, FILM COATED ORAL ONCE
Status: COMPLETED | OUTPATIENT
Start: 2025-04-16 | End: 2025-04-16

## 2025-04-16 RX ORDER — AZITHROMYCIN 500 MG/1
TABLET, FILM COATED ORAL
Status: DISCONTINUED
Start: 2025-04-16 | End: 2025-04-16 | Stop reason: HOSPADM

## 2025-04-16 RX ORDER — ONDANSETRON HYDROCHLORIDE 2 MG/ML
4 INJECTION, SOLUTION INTRAVENOUS ONCE
Status: COMPLETED | OUTPATIENT
Start: 2025-04-16 | End: 2025-04-16

## 2025-04-16 RX ADMIN — AZITHROMYCIN 500 MG: 500 TABLET, FILM COATED ORAL at 13:16

## 2025-04-16 RX ADMIN — ONDANSETRON 4 MG: 2 INJECTION, SOLUTION INTRAMUSCULAR; INTRAVENOUS at 11:31

## 2025-04-16 RX ADMIN — IOHEXOL 140 ML: 350 INJECTION, SOLUTION INTRAVENOUS at 12:29

## 2025-04-16 RX ADMIN — AMOXICILLIN 1000 MG: 250 CAPSULE ORAL at 13:16

## 2025-04-16 ASSESSMENT — COLUMBIA-SUICIDE SEVERITY RATING SCALE - C-SSRS
2. HAVE YOU ACTUALLY HAD ANY THOUGHTS OF KILLING YOURSELF?: NO
1. IN THE PAST MONTH, HAVE YOU WISHED YOU WERE DEAD OR WISHED YOU COULD GO TO SLEEP AND NOT WAKE UP?: NO
6. HAVE YOU EVER DONE ANYTHING, STARTED TO DO ANYTHING, OR PREPARED TO DO ANYTHING TO END YOUR LIFE?: NO

## 2025-04-16 ASSESSMENT — LIFESTYLE VARIABLES
EVER FELT BAD OR GUILTY ABOUT YOUR DRINKING: NO
EVER HAD A DRINK FIRST THING IN THE MORNING TO STEADY YOUR NERVES TO GET RID OF A HANGOVER: NO
TOTAL SCORE: 0
HAVE PEOPLE ANNOYED YOU BY CRITICIZING YOUR DRINKING: NO
HAVE YOU EVER FELT YOU SHOULD CUT DOWN ON YOUR DRINKING: NO

## 2025-04-16 ASSESSMENT — PAIN SCALES - GENERAL: PAINLEVEL_OUTOF10: 2

## 2025-04-16 ASSESSMENT — PAIN DESCRIPTION - LOCATION: LOCATION: ABDOMEN

## 2025-04-16 ASSESSMENT — PAIN - FUNCTIONAL ASSESSMENT: PAIN_FUNCTIONAL_ASSESSMENT: 0-10

## 2025-04-16 NOTE — ED TRIAGE NOTES
Patient here for abdominal pain States he recently had Flu A and hasn't felt the same since. Endorses dizziness, generalized abdominal pain. Decreased PO intake. Endorses difficulty swallowing and has hx of throat ca. Denies nausea,vomiting,diarrhea,constipation.

## 2025-04-17 LAB — HOLD SPECIMEN: NORMAL

## 2025-04-18 NOTE — ED PROVIDER NOTES
HPI   Chief Complaint   Patient presents with    Abdominal Pain     States he recently had Flu A and hasn't felt the same since. Endorses dizziness, generalized abdominal pain. Decreased PO intake. Endorses difficulty swallowing and has hx of throat ca. Denies nausea,vomiting,diarrhea,constipation.        History of present illness:  75-year-old male presents to the emergency room for complaints of persistent generalized weakness and malaise.  The patient states that he was treated for influenza A a few weeks ago and he states that since then he just really is not felt like he needs to eat anything when he does eat he gets very nauseous.  He states he is still coughing a lot and states he just does not seem to be improving.  He also notes that sometimes when he swallows it feels like food is getting stuck in his throat and he sometimes has to drink water to get down and states he was treated for throat cancer in the past and that was about 3 years ago.  He states he is currently being monitored for this and states he has not had any further issues he has been aware of.  He states he has also had a persistent cough that will not seem to go away and is nonproductive.  He confirms that he is a lifelong smoker as well and is still actively smoking but has not smoked in about 2 weeks.  He denies any other symptoms at this time.    Social history: Negative for alcohol and drug use.    Review of systems:   Gen.: No weight loss  Eyes: No vision loss, double vision, drainage, eye pain.   ENT: No pharyngitis, dry mouth.   Cardiac: No chest pain, palpitations, syncope, near syncope.   Pulmonary: No shortness of breath, hemoptysis.   Heme/lymph: No swollen glands, fever, bleeding.   GI: No  change in bowel habits, melena, hematemesis, hematochezia, vomiting, diarrhea.   : No discharge, dysuria, frequency, urgency, hematuria.   Musculoskeletal: No limb pain, joint pain, joint swelling.   Skin: No rashes.   Review of systems is  otherwise negative unless stated above or in history of present illness.      Physical exam:  General: Vitals noted, no distress. Afebrile.   EENT: Posterior oropharynx unremarkable.  No lymphadenopathy appreciated  Cardiac: Regular, rate, rhythm, no murmur.   Pulmonary: Lungs clear bilaterally with good aeration. No adventitious breath sounds.   Abdomen: Soft, nonsurgical. Nontender. No peritoneal signs. Normoactive bowel sounds.   Extremities: No peripheral edema.   Skin: No rash.   Neuro: No focal neurologic deficits          Medical decision making:   Testing: Urinalysis unremarkable CBC CMP lactate unremarkable with exception of chronic anemia noted CT scan of neck and chest for PE study shows bilateral mucous plugging of the lower lobes associate patchy infiltrates as interpreted by radiology, CT scan that did not show any acute findings interpreted by radiology.  Plan: Home-going.  Discussed differential. Will follow-up with the primary physician in the next 2-3 days. Return if worse. They understand return precautions and discharge instructions. Patient and family/friend/caregiver are in agreement with this plan. 75-year-old male presents to the emergency room for complaints of persistent generalized weakness and malaise.  The patient states that he was treated for influenza A a few weeks ago and he states that since then he just really is not felt like he needs to eat anything when he does eat he gets very nauseous.  He states he is still coughing a lot and states he just does not seem to be improving.  He also notes that sometimes when he swallows it feels like food is getting stuck in his throat and he sometimes has to drink water to get down and states he was treated for throat cancer in the past and that was about 3 years ago.  He states he is currently being monitored for this and states he has not had any further issues he has been aware of.  He states he has also had a persistent cough that will not  seem to go away and is nonproductive.  He confirms that he is a lifelong smoker as well and is still actively smoking but has not smoked in about 2 weeks.  He denies any other symptoms at this time. EENT: Posterior oropharynx unremarkable.  No lymphadenopathy appreciated  Cardiac: Regular, rate, rhythm, no murmur.   Pulmonary: Lungs clear bilaterally with good aeration. No adventitious breath sounds.   Abdomen: Soft, nonsurgical. Nontender. No peritoneal signs. Normoactive bowel sounds.  I explained to the patient that I would be sending him home this time a short course of antibiotics and he was given some Zofran this time was elbow passed p.o. challenge with no difficulty.  I explained to the patient that we could also admit him at this time but he wanted to go home at this time and we used some shared decision making and I spoke with the family at bedside.  They were in agreement with this plan as well and explained to them that they are happy to return in the event that his symptoms progressed or if he developed a fever and I also counseled him on other signs and symptoms return to emergency room for.  Impression:   1.  Pneumonia            History provided by:  Patient   used: No            Patient History   Medical History[1]  Surgical History[2]  Family History[3]  Social History[4]    Physical Exam   ED Triage Vitals [04/16/25 1018]   Temperature Heart Rate Respirations BP   36.1 °C (97 °F) 84 18 113/86      Pulse Ox Temp Source Heart Rate Source Patient Position   98 % Skin Monitor Lying      BP Location FiO2 (%)     Right arm --       Physical Exam      ED Course & MDM   Diagnoses as of 04/18/25 0042   Pneumonia of both lower lobes due to infectious organism                 No data recorded     [place] Coma Scale Score: 15 (04/16/25 1027 : Ronald Schroeder RN)                           Medical Decision Making      Procedure  Procedures       [1] History reviewed. No pertinent past medical  history.  [2]   Past Surgical History:  Procedure Laterality Date    OTHER SURGICAL HISTORY  08/18/2022    Shoulder surgery   [3] No family history on file.  [4]   Social History  Tobacco Use    Smoking status: Some Days     Types: Cigarettes    Smokeless tobacco: Not on file   Vaping Use    Vaping status: Never Used   Substance Use Topics    Alcohol use: Never    Drug use: Never        Allen Christine PA-C  04/18/25 0046

## 2025-04-22 ENCOUNTER — APPOINTMENT (OUTPATIENT)
Dept: SURGICAL ONCOLOGY | Facility: CLINIC | Age: 75
End: 2025-04-22
Payer: MEDICARE

## 2025-04-29 NOTE — PROGRESS NOTES
History Of Present Illness  Jeff Naranjo is a 75 y.o. male presents for follow up surveillance of laryngeal cancer.       74 y.o. male presents to [facility] with a T3N0M0 laryngeal squamous cell carcinoma. He underwent triple endoscopy, tracheostomy, and PEG tube placement on 1/10/2022. The patient completed his chemoradiation on 4/20/2022.      The patients course has been complicated by complete stenosis of the upper esophagus s/p dilation and stent placement on 8/3/2022 as well as a new finding of a sigmoid adenocarcinoma. In addition, during work-up for her his colon issues he was also found to have a prostate mass for which he is seeing urology. Work-up of the prostate was negative for malignancy. He is now status post surgery (with Dr. Fallon) for his rectal cancer.      04/04/24- Last seen by Dr Guerin   The patient presents today for follow-up.  He has been doing fine from a laryngeal cancer standpoint.  He still continues to have some dysphagia and some tightness around his neck, which I think is radiation related.  He is following up with his gastroenterologist and colorectal surgeon.  He seems to be doing well from a rectal cancer standpoint.  He continues to tolerate a diet and his weight is stable.  He denies any voice or breathing difficulties.    Today,   Denies acute changes. Endorses fluctuating dysphagia with certain foods but overall manageable. Discussed xerostomia- currntly drinks a lot of water hasn't tried any mouth rinse. No new pain, sores, neck mass, etc.     Lab Results   Component Value Date    TSH 3.68 04/04/2024       Past Medical History  No past medical history on file.      Surgical History  Past Surgical History:   Procedure Laterality Date    OTHER SURGICAL HISTORY  08/18/2022    Shoulder surgery        Social History  He reports that he has been smoking cigarettes. He does not have any smokeless tobacco history on file. He reports that he  does not drink alcohol and does not use drugs.    Family History  No family history on file.     Allergies  Patient has no known allergies.     Physical Exam:  CONSTITUTIONAL:  No acute distress  VOICE:  mild-moderate hoarseness   RESPIRATION:  Breathing comfortably, no stridor  CV:  No clubbing/cyanosis/edema in hands  EYES:  EOM intact, sclera normal  NEURO:  Alert and oriented times 3, Cranial nerves II-XII grossly intact and symmetric bilaterally  HEAD AND FACE:  Symmetric facial features, no masses or lesion  SALIVARY GLANDS:  Parotid and submandibular glands normal bilaterally  EARS:  Normal external ears, external auditory canals, and TMs to otoscopy, normal hearing to conversational voice.  NOSE:  External nose midline, anterior rhinoscopy is normal with limited visualization to the anterior aspect of the interior turbinates, no bleeding or drainage, no lesions  ORAL CAVITY/OROPHARYNX/LIPS:  Normal mucous membranes, normal floor of mouth/tongue/OP, no masses or lesions  PHARYNGEAL WALLS:  No masses or lesions  NECK/LYMPH:  Neck skin with postradiation changes, fibrosis, no LAD  SKIN:  Neck skin is without scar or injury  PSYCH:  Alert and oriented with appropriate mood and affect    Procedure Note: Flexible Nasolaryngoscopy  Verbal informed consent was obtained from the patient/patient's guardian. 4% lidocaine mixed with phenylephrine was prepared and dripped into the nose. It was placed in the right naris. Following an appropriate amount of time to allow for adequate anesthesia, a flexible fiberoptic nasolaryngoscope was placed into the patient's right naris. The nasal cavity, nasopharynx, oropharynx, hypopharynx, and all endolaryngeal structures were visualized and were normal except as listed below. Significant findings included:  -thick secretions  at larynx no masses or lesions   - VC mobile bilaterally     Assessment and Plan  75 y.o. male with T3N0M0 laryngeal squamous cell carcinoma s/p definitive  CXRT completed 4/2022.     Scope today SHELL   Thyroid testing  yearly- 04/24 TSH wnl   Dysphagia- managing current diet,   Xerostomia - Recommend adding biotene    Ashley Hoang MD

## 2025-05-06 ENCOUNTER — APPOINTMENT (OUTPATIENT)
Dept: OTOLARYNGOLOGY | Facility: HOSPITAL | Age: 75
End: 2025-05-06
Payer: MEDICARE

## 2025-05-27 ENCOUNTER — LAB (OUTPATIENT)
Dept: LAB | Facility: HOSPITAL | Age: 75
End: 2025-05-27
Payer: MEDICARE

## 2025-05-27 ENCOUNTER — OFFICE VISIT (OUTPATIENT)
Dept: OTOLARYNGOLOGY | Facility: HOSPITAL | Age: 75
End: 2025-05-27
Payer: MEDICARE

## 2025-05-27 VITALS — TEMPERATURE: 97.2 F | WEIGHT: 111 LBS | BODY MASS INDEX: 20.43 KG/M2 | HEIGHT: 62 IN

## 2025-05-27 DIAGNOSIS — R13.10 DYSPHAGIA, UNSPECIFIED TYPE: ICD-10-CM

## 2025-05-27 DIAGNOSIS — Z08 ENCOUNTER FOR FOLLOW-UP SURVEILLANCE OF HEAD AND NECK CANCER: Primary | ICD-10-CM

## 2025-05-27 DIAGNOSIS — C32.9 LARYNGEAL SQUAMOUS CELL CARCINOMA: ICD-10-CM

## 2025-05-27 DIAGNOSIS — E03.9 ACQUIRED HYPOTHYROIDISM: ICD-10-CM

## 2025-05-27 DIAGNOSIS — Z85.89 ENCOUNTER FOR FOLLOW-UP SURVEILLANCE OF HEAD AND NECK CANCER: Primary | ICD-10-CM

## 2025-05-27 LAB — TSH SERPL-ACNC: 2.88 MIU/L (ref 0.44–3.98)

## 2025-05-27 PROCEDURE — 99214 OFFICE O/P EST MOD 30 MIN: CPT | Performed by: STUDENT IN AN ORGANIZED HEALTH CARE EDUCATION/TRAINING PROGRAM

## 2025-05-27 PROCEDURE — 1159F MED LIST DOCD IN RCRD: CPT | Performed by: STUDENT IN AN ORGANIZED HEALTH CARE EDUCATION/TRAINING PROGRAM

## 2025-05-27 PROCEDURE — 84443 ASSAY THYROID STIM HORMONE: CPT

## 2025-05-27 PROCEDURE — 1126F AMNT PAIN NOTED NONE PRSNT: CPT | Performed by: STUDENT IN AN ORGANIZED HEALTH CARE EDUCATION/TRAINING PROGRAM

## 2025-05-27 PROCEDURE — 36415 COLL VENOUS BLD VENIPUNCTURE: CPT

## 2025-05-27 PROCEDURE — 1160F RVW MEDS BY RX/DR IN RCRD: CPT | Performed by: STUDENT IN AN ORGANIZED HEALTH CARE EDUCATION/TRAINING PROGRAM

## 2025-05-27 PROCEDURE — 1157F ADVNC CARE PLAN IN RCRD: CPT | Performed by: STUDENT IN AN ORGANIZED HEALTH CARE EDUCATION/TRAINING PROGRAM

## 2025-05-27 ASSESSMENT — PATIENT HEALTH QUESTIONNAIRE - PHQ9
SUM OF ALL RESPONSES TO PHQ9 QUESTIONS 1 & 2: 0
1. LITTLE INTEREST OR PLEASURE IN DOING THINGS: NOT AT ALL
2. FEELING DOWN, DEPRESSED OR HOPELESS: NOT AT ALL

## 2025-05-27 ASSESSMENT — PAIN SCALES - GENERAL: PAINLEVEL_OUTOF10: 0-NO PAIN

## 2025-05-27 NOTE — PROGRESS NOTES
History Of Present Illness  Jeff Naranjo is a 75 y.o. male presents for follow up surveillance of laryngeal cancer.       74 y.o. male presents to [facility] with a T3N0M0 laryngeal squamous cell carcinoma. He underwent triple endoscopy, tracheostomy, and PEG tube placement on 1/10/2022. The patient completed his chemoradiation on 4/20/2022.      The patients course has been complicated by complete stenosis of the upper esophagus s/p dilation and stent placement on 8/3/2022 as well as a new finding of a sigmoid adenocarcinoma. In addition, during work-up for her his colon issues he was also found to have a prostate mass for which he is seeing urology. Work-up of the prostate was negative for malignancy. He is now status post surgery (with Dr. Fallon) for his rectal cancer.      04/04/24- Last seen by Dr Guerin   The patient presents today for follow-up.  He has been doing fine from a laryngeal cancer standpoint.  He still continues to have some dysphagia and some tightness around his neck, which I think is radiation related.  He is following up with his gastroenterologist and colorectal surgeon.  He seems to be doing well from a rectal cancer standpoint.  He continues to tolerate a diet and his weight is stable.  He denies any voice or breathing difficulties.    12/3/24  Denies acute changes. Endorses fluctuating dysphagia with certain foods but overall manageable. Discussed xerostomia- currntly drinks a lot of water hasn't tried any mouth rinse. No new pain, sores, neck mass, etc.     TODAY 5/27/25  Neck feels tight (stable)   Swallowing dysfunction stable. No coughing or choking with eating. Having trouble with pills. Food not feeling like it is getting stuck. Weight stable.   No voice changes.   Had Flu related PNA and was seen in ED in April.  Seems to have recovered  well from that.       Lab Results   Component Value Date    TSH 3.68 04/04/2024       Past Medical  History  No past medical history on file.      Surgical History  Past Surgical History:   Procedure Laterality Date    OTHER SURGICAL HISTORY  08/18/2022    Shoulder surgery        Social History  He reports that he has been smoking cigarettes. He does not have any smokeless tobacco history on file. He reports that he does not drink alcohol and does not use drugs.    Family History  No family history on file.     Allergies  Patient has no known allergies.     Physical Exam:  CONSTITUTIONAL:  No acute distress  VOICE:  mild-moderate hoarseness   RESPIRATION:  Breathing comfortably, no stridor  CV:  No clubbing/cyanosis/edema in hands  EYES:  EOM intact, sclera normal  NEURO:  Alert and oriented times 3, Cranial nerves II-XII grossly intact and symmetric bilaterally  HEAD AND FACE:  Symmetric facial features, no masses or lesion  SALIVARY GLANDS:  Parotid and submandibular glands normal bilaterally  EARS:  Normal external ears  NOSE:  External nose midline, anterior rhinoscopy is normal with limited visualization to the anterior aspect of the interior turbinates, no bleeding or drainage, no lesions  ORAL CAVITY/OROPHARYNX/LIPS:  Edentulous with dentures; Normal mucous membranes, normal floor of mouth/tongue/OP, no masses or lesions  PHARYNGEAL WALLS:  No masses or lesions  NECK/LYMPH:  Neck skin with postradiation changes, fibrosis, no LAD  SKIN:  Neck skin is without scar or injury  PSYCH:  Alert and oriented with appropriate mood and affect    Procedure Note: Flexible Nasolaryngoscopy  Verbal informed consent was obtained from the patient/patient's guardian. 4% lidocaine mixed with phenylephrine was prepared and dripped into the nose. It was placed in the right naris. Following an appropriate amount of time to allow for adequate anesthesia, a flexible fiberoptic nasolaryngoscope was placed into the patient's right naris. The nasal cavity, nasopharynx, oropharynx, hypopharynx, and all endolaryngeal structures were  visualized and were normal except as listed below. Significant findings included:  -Immobile RIGHT vocal cord without mass or lesion, stable from prior    IMAGING   Ct neck with contrast 4/16/25   FINDINGS:  Compared to the CT neck from 12/21/2021, there is resolution of  abnormal soft tissue located within the right larynx and protruding  into the right hypopharynx. There is mild focal medialization of the  right true vocal cord (series 202, image 80 of 136) which could  reflect focal vocal cord paralysis/paresis and is similar to the  PET-CT from 07/29/2022. There is slight asymmetric sclerosis located  within the right arytenoid cartilage compared to the left.      No masses are seen within the visualized aerodigestive tract. There  is no prevertebral or retropharyngeal edema.      There is no cervical lymphadenopathy by size criteria.      There is atrophy of the submandibular glands, likely treatment  related. No discrete masses are seen within the parotid or  submandibular glands or the thyroid gland.      There is atherosclerotic disease located within the carotid arteries  causing no striking luminal narrowing.      There is a small osteoma located within the left frontal sinus.  Visualized paranasal sinuses and mastoid air cells are otherwise  essentially clear.      There are osseous degenerative changes of the cervical spine at  multiple levels including disc osteophyte complexes at few levels,  most pronounced at C3-C4.      Findings within the chest will be detailed in a separate report.      There are osseous degenerative changes involving the  temporomandibular joints and the patient is edentulous.      IMPRESSION:  1. Treatment related changes within the visualized aerodigestive  tract and surrounding soft tissues without evidence of recurrent or  residual tumor, consider direct visualization of the true vocal cords  as clinically indicated and on a nonemergent basis.      2. No cervical  lymphadenopathy by size criteria.    CT Chest   FINDINGS:  There is no evidence for aortic dissection or pericardial effusion.  Ascending thoracic aortic diameter is 2.7 cm. There is no evidence  for pulmonary embolism.      Emphysematous change of the pulmonary parenchyma is present diffusely  bilaterally. There is mucous plugging within the lower lobes  bilaterally with associated somewhat nodular appearing infiltrates  distal to the areas of mucous plugging. No effusions are identified.      Chest Wall: No chest wall abnormalities are identified.      Upper Abdominal Findings: Multiple punctate pancreatic calcifications  are present likely secondary to chronic pancreatitis.      Skeletal System: No acute fractures or destructive lesions are  identified. There is a thoracic dextroscoliosis.      IMPRESSION:  1. No evidence for pulmonary embolism.  2. Mucous plugging within the lower lobes bilaterally with associated  patchy infiltrates distal to the areas of mucous plugging.  3. No effusions are identified.  4. Chronic pancreatitis.        Assessment and Plan  75 y.o. male with T3N0M0 laryngeal squamous cell carcinoma s/p definitive CXRT completed 4/2022.     Scope today SHELL   Thyroid testing  yearly- rpt test pending today  Dysphagia- managing with  current diet,   Xerostomia - Recommended adding biotene  Follow up 6 months    Ashley Hoang MD